# Patient Record
Sex: MALE | Race: WHITE | NOT HISPANIC OR LATINO | Employment: FULL TIME | URBAN - METROPOLITAN AREA
[De-identification: names, ages, dates, MRNs, and addresses within clinical notes are randomized per-mention and may not be internally consistent; named-entity substitution may affect disease eponyms.]

---

## 2017-02-08 ENCOUNTER — OFFICE VISIT (OUTPATIENT)
Dept: FAMILY MEDICINE | Facility: CLINIC | Age: 47
End: 2017-02-08
Payer: COMMERCIAL

## 2017-02-08 VITALS
SYSTOLIC BLOOD PRESSURE: 110 MMHG | BODY MASS INDEX: 27.38 KG/M2 | DIASTOLIC BLOOD PRESSURE: 72 MMHG | RESPIRATION RATE: 20 BRPM | OXYGEN SATURATION: 98 % | WEIGHT: 190.81 LBS | HEART RATE: 62 BPM

## 2017-02-08 DIAGNOSIS — R09.81 SINUS CONGESTION: Primary | ICD-10-CM

## 2017-02-08 PROCEDURE — 99999 PR PBB SHADOW E&M-EST. PATIENT-LVL III: CPT | Mod: PBBFAC,,, | Performed by: FAMILY MEDICINE

## 2017-02-08 PROCEDURE — 99213 OFFICE O/P EST LOW 20 MIN: CPT | Mod: S$GLB,,, | Performed by: FAMILY MEDICINE

## 2017-02-08 NOTE — PROGRESS NOTES
HPI:  Joshua Luna is a 46 y.o. year old male that  presents with complaint of sinus problems and low grade fever to 99F. He states marty he is not having a sore throat and not much coughing. He has nasal spray at home but has not used it. Major symptoms are of congestion of his nasal and drainage with ear pressure.   Chief Complaint   Patient presents with    Sore Throat     few days    Sinus Problem    Fever    Headache    Otalgia   .       Past Medical History   Diagnosis Date    Arthritis     Dry eyes     Dry mouth      Social History     Social History    Marital status:      Spouse name: N/A    Number of children: N/A    Years of education: N/A     Occupational History    Not on file.     Social History Main Topics    Smoking status: Never Smoker    Smokeless tobacco: Not on file    Alcohol use Yes      Comment: occasional    Drug use: No    Sexual activity: Not on file     Other Topics Concern    Not on file     Social History Narrative     Past Surgical History   Procedure Laterality Date    Vasectomy  2003    Knee surgery  2009     5    Elbow surgery  2006     right surgery    Rotator cuff repair  [id number]     right shoulder     Family History   Problem Relation Age of Onset    Cancer Mother      Breast & colon    Arthritis Mother     Bell's palsy Mother     Rheum arthritis Mother     Diabetes Father     Arthritis Father     Pulmonary fibrosis Father     Hypertension Father     Arthritis Sister     Asthma Daughter     Eczema Daughter     Asthma Son     Narcolepsy Sister     No Known Problems Sister     No Known Problems Sister     No Known Problems Sister            Review of Systems  General ROS: negative for chills, fever or weight loss  ENT ROS: negative for epistaxis,pos sore throat , congestion ,headcahe  Respiratory ROS: no cough, shortness of breath, or wheezing  Cardiovascular ROS: no chest pain or dyspnea on exertion  Gastrointestinal ROS: no abdominal  pain, change in bowel habits, or black/ bloody stools    Physical Exam:  Visit Vitals    /72 (BP Location: Right arm, Patient Position: Sitting, BP Method: Manual)    Pulse 62    Resp 20    Wt 86.5 kg (190 lb 12.9 oz)    SpO2 98%    BMI 27.38 kg/m2     General appearance: alert, cooperative, no distress  Constitutional:Oriented to person, place, and time.appears well-developed and well-nourished.  HEENT: Normocephalic, atraumatic, neck symmetrical, no nasal discharge, TM- clear bilaterally, no sinus tenderness, Throat clear with no erythema, no LAD  Lungs: clear to auscultation bilaterally, no dullness to percussion bilaterally  Heart: regular rate and rhythm without rub; no displacement of the PMI , S1&S2 present    LABS:    Complete Blood Count  Lab Results   Component Value Date    RBC 4.19 (L) 09/15/2015    HGB 13.3 (L) 09/15/2015    HCT 39.3 (L) 09/15/2015    MCV 94 09/15/2015    MCH 31.7 (H) 09/15/2015    MCHC 33.8 09/15/2015    RDW 12.4 09/15/2015     09/15/2015    MPV 10.3 09/15/2015    GRAN 2.2 09/15/2015    GRAN 44.7 09/15/2015    LYMPH 2.0 09/15/2015    LYMPH 40.1 09/15/2015    MONO 0.5 09/15/2015    MONO 10.5 09/15/2015    EOS 0.2 09/15/2015    BASO 0.03 09/15/2015    EOSINOPHIL 3.9 09/15/2015    BASOPHIL 0.6 09/15/2015    DIFFMETHOD Automated 09/15/2015       Comprehensive Metabolic Panel  Lab Results   Component Value Date    GLU 87 09/15/2015    BUN 6 09/15/2015    CREATININE 0.8 09/15/2015     (L) 09/15/2015    K 4.0 09/15/2015     09/15/2015    PROT 6.9 09/15/2015    ALBUMIN 4.5 09/15/2015    BILITOT 0.3 09/15/2015    AST 20 09/15/2015    ALKPHOS 58 09/15/2015    CO2 27 09/15/2015    ALT 22 09/15/2015    ANIONGAP 8 09/15/2015    EGFRNONAA >60.0 09/15/2015    ESTGFRAFRICA >60.0 09/15/2015       TSH  Lab Results   Component Value Date    TSH 1.468 09/15/2015         Assessment:    ICD-10-CM ICD-9-CM    1. Sinus congestion R09.81 478.19          Plan:    Return if  symptoms worsen or fail to improve.          Sherry Farley MD

## 2017-02-08 NOTE — MR AVS SNAPSHOT
[facility]  [address] [place] [address]  Henny MENDOZA [address]  Phone: [phone]  Fax: [phone]                  Joshua Luna   2017 3:20 PM   Office Visit    Description:  Male : 1970   Provider:  Sherry Farley MD   Department:  [facility]           Reason for Visit     Sore Throat     Sinus Problem     Fever     Headache     Otalgia                To Do List           Goals (5 Years of Data)     None      [facility] On Call     [facility] On Call Nurse [facility] Line -  Assistance  Registered nurses in the [facility] On Call Center provide clinical advisement, health education, appointment booking, and other advisory services.  Call for this free service at [phone].             Medications           Message regarding Medications     Verify the changes and/or additions to your medication regime listed below are the same as discussed with your clinician today.  If any of these changes or additions are incorrect, please notify your healthcare provider.             Verify that the below list of medications is an accurate representation of the medications you are currently taking.  If none reported, the list may be blank. If incorrect, please contact your healthcare provider. Carry this list with you in case of emergency.           Current Medications     hydrocodone-acetaminophen 7.5-325mg (NORCO) 7.5-325 mg per tablet Take 1 tablet by mouth every 6 (six) hours as needed for Pain.    ibuprofen (ADVIL,MOTRIN) 600 MG tablet TAKE ONE TABLET BY MOUTH THREE TIMES DAILY           Clinical Reference Information           Your Vitals Were     BP Pulse Resp Weight SpO2 BMI    110/72 (BP Location: Right arm, Patient Position: Sitting, BP Method: Manual) 62 20 86.5 kg (190 lb 12.9 oz) 98% 27.38 kg/m2      Blood Pressure          Most Recent Value    BP  110/72      Allergies as of 2017     No Known Allergies      Immunizations Administered on Date of Encounter - 2017     None       Language Assistance Services     ATTENTION: Language assistance services are available, free of charge. Please call [phone].      ATENCIÓN: Si habla rizwana, tiene a pardo disposición servicios gratuitos de asistencia lingüística. Llame al [phone].     CHÚ Ý: N?u b?n nói Ti?ng Vi?t, có các d?ch v? h? tr? ngôn ng? mi?n phí dành cho b?n. G?i s? [phone].         [facility] complies with applicable Federal civil rights laws and does not discriminate on the basis of race, color, national origin, age, disability, or sex.

## 2017-09-27 ENCOUNTER — CLINICAL SUPPORT (OUTPATIENT)
Dept: OTHER | Facility: CLINIC | Age: 47
End: 2017-09-27
Payer: COMMERCIAL

## 2017-09-27 VITALS
WEIGHT: 198 LBS | SYSTOLIC BLOOD PRESSURE: 141 MMHG | HEIGHT: 70 IN | DIASTOLIC BLOOD PRESSURE: 93 MMHG | BODY MASS INDEX: 28.35 KG/M2

## 2017-09-27 DIAGNOSIS — Z00.8 HEALTH EXAMINATION IN POPULATION SURVEYS: Primary | ICD-10-CM

## 2017-09-27 LAB
GLUCOSE SERPL-MCNC: 89 MG/DL (ref 60–140)
POC CHOLESTEROL, HDL: 61 MG/DL (ref 40–?)
POC CHOLESTEROL, LDL: 102 MG/DL (ref ?–160)
POC CHOLESTEROL, TOTAL: 180 MG/DL (ref ?–240)
POC GLUCOSE FASTING: NORMAL MG/DL (ref 60–110)
POC TOTAL CHOLESTEROL / HDL RATIO: 2.95 (ref ?–6)
POC TRIGLYCERIDES: 88 MG/DL (ref ?–160)

## 2017-09-27 PROCEDURE — 80061 LIPID PANEL: CPT | Mod: QW,S$GLB,, | Performed by: INTERNAL MEDICINE

## 2017-09-27 PROCEDURE — 82947 ASSAY GLUCOSE BLOOD QUANT: CPT | Mod: QW,S$GLB,, | Performed by: INTERNAL MEDICINE

## 2017-09-27 PROCEDURE — 99401 PREV MED CNSL INDIV APPRX 15: CPT | Mod: S$GLB,,, | Performed by: INTERNAL MEDICINE

## 2018-03-19 DIAGNOSIS — R73.9 HYPERGLYCEMIA: Primary | ICD-10-CM

## 2018-07-06 ENCOUNTER — OFFICE VISIT (OUTPATIENT)
Dept: FAMILY MEDICINE | Facility: CLINIC | Age: 48
End: 2018-07-06
Payer: COMMERCIAL

## 2018-07-06 VITALS
HEART RATE: 96 BPM | OXYGEN SATURATION: 99 % | BODY MASS INDEX: 28.28 KG/M2 | DIASTOLIC BLOOD PRESSURE: 88 MMHG | SYSTOLIC BLOOD PRESSURE: 138 MMHG | TEMPERATURE: 98 F | HEIGHT: 70 IN | WEIGHT: 197.56 LBS

## 2018-07-06 DIAGNOSIS — M25.462 KNEE EFFUSION, LEFT: ICD-10-CM

## 2018-07-06 DIAGNOSIS — Z23 NEED FOR TETANUS BOOSTER: Primary | ICD-10-CM

## 2018-07-06 PROCEDURE — 90471 IMMUNIZATION ADMIN: CPT | Mod: S$GLB,,, | Performed by: FAMILY MEDICINE

## 2018-07-06 PROCEDURE — 3008F BODY MASS INDEX DOCD: CPT | Mod: CPTII,S$GLB,, | Performed by: FAMILY MEDICINE

## 2018-07-06 PROCEDURE — 99212 OFFICE O/P EST SF 10 MIN: CPT | Mod: 25,S$GLB,, | Performed by: FAMILY MEDICINE

## 2018-07-06 PROCEDURE — 90715 TDAP VACCINE 7 YRS/> IM: CPT | Mod: S$GLB,,, | Performed by: FAMILY MEDICINE

## 2018-07-06 RX ORDER — PREDNISONE 20 MG/1
TABLET ORAL
Qty: 14 TABLET | Refills: 1 | Status: SHIPPED | OUTPATIENT
Start: 2018-07-06 | End: 2019-05-21

## 2018-07-06 RX ORDER — CLONIDINE HYDROCHLORIDE 0.1 MG/1
1 TABLET ORAL DAILY PRN
Refills: 0 | COMMUNITY
Start: 2018-05-31 | End: 2019-05-21

## 2018-07-08 NOTE — PROGRESS NOTES
" Patient ID: Joshua Luna is a 47 y.o. male.    Chief Complaint: Knee Pain (left)    HPI       Joshua Luna is a 47 y.o. male with hx of torn meniscus and he re injured it recently - it was swollen a few days but feels it has gone down some and not as painful.  Going on vacation would like me to consider drainage.        Review of Symptoms    Constitutional  No change in activity, No chills fever   Resp  Neg hemoptysis, stridor, choking  CVS  Neg chest pain, palpitations    /88   Pulse 96   Temp 97.6 °F (36.4 °C)   Ht 5' 10" (1.778 m)   Wt 89.6 kg (197 lb 8.5 oz)   SpO2 99%   BMI 28.34 kg/m²     Physical Exam    Constitutional:   Oriented to person, place, and time.appears well-developed and well-nourished.   No distress.     HENT  Head: Normocephalic and atraumatic  Right Ear: External ear normal.   Left Ear: External ear normal.   Nose: External nose normal.   Mouth: Moist mucous membranes    Eyes:   Conjunctivae are normal. Right eye exhibits no discharge. Left eye exhibits no discharge. No scleral icterus. No periorbital edema    Musculoskeletal:  No edema. No obvious deformity No wasting     Neurological:  Alert and oriented to person, place, and time. Coordination normal.     Skin:   Skin is warm and dry.  No diaphoresis.   No rash noted.     Psychiatric: Normal mood and affect. Behavior is normal. Judgment and thought content normal.     Knee left  Small effusuion  Pain only with felxeion of over 90 degrees.    Assessment / Plan:      ICD-10-CM ICD-9-CM   1. Need for tetanus booster Z23 V03.7   2. Knee effusion, left M25.462 719.06     Need for tetanus booster    Knee effusion, left    Other orders  -     (In Office Administered) Tdap Vaccine  -     predniSONE (DELTASONE) 20 MG tablet; 2 pills daily for 5-7 days  Dispense: 14 tablet; Refill: 1      "

## 2018-11-06 ENCOUNTER — CLINICAL SUPPORT (OUTPATIENT)
Dept: OTHER | Facility: CLINIC | Age: 48
End: 2018-11-06
Payer: COMMERCIAL

## 2018-11-06 DIAGNOSIS — Z00.8 ENCOUNTER FOR OTHER GENERAL EXAMINATION: ICD-10-CM

## 2018-11-06 PROCEDURE — 82947 ASSAY GLUCOSE BLOOD QUANT: CPT | Mod: QW,S$GLB,, | Performed by: INTERNAL MEDICINE

## 2018-11-06 PROCEDURE — 99401 PREV MED CNSL INDIV APPRX 15: CPT | Mod: S$GLB,,, | Performed by: INTERNAL MEDICINE

## 2018-11-06 PROCEDURE — 80061 LIPID PANEL: CPT | Mod: QW,S$GLB,, | Performed by: INTERNAL MEDICINE

## 2018-11-08 VITALS — HEIGHT: 69 IN | BODY MASS INDEX: 29.17 KG/M2

## 2018-11-08 LAB
GLUCOSE SERPL-MCNC: 93 MG/DL (ref 70–110)
HDLC SERPL-MCNC: 43 MG/DL
POC CHOLESTEROL, LDL (DOCK): 53 MG/DL
POC CHOLESTEROL, TOTAL: 153 MG/DL
TRIGL SERPL-MCNC: 286 MG/DL

## 2019-05-21 ENCOUNTER — OFFICE VISIT (OUTPATIENT)
Dept: FAMILY MEDICINE | Facility: CLINIC | Age: 49
End: 2019-05-21
Payer: COMMERCIAL

## 2019-05-21 VITALS
BODY MASS INDEX: 29.4 KG/M2 | SYSTOLIC BLOOD PRESSURE: 138 MMHG | HEART RATE: 76 BPM | WEIGHT: 198.5 LBS | TEMPERATURE: 98 F | OXYGEN SATURATION: 97 % | HEIGHT: 69 IN | DIASTOLIC BLOOD PRESSURE: 98 MMHG

## 2019-05-21 DIAGNOSIS — Z00.00 ROUTINE HEALTH MAINTENANCE: ICD-10-CM

## 2019-05-21 DIAGNOSIS — R03.0 ELEVATED BLOOD PRESSURE READING: Primary | ICD-10-CM

## 2019-05-21 PROCEDURE — 3008F BODY MASS INDEX DOCD: CPT | Mod: CPTII,S$GLB,, | Performed by: FAMILY MEDICINE

## 2019-05-21 PROCEDURE — 99212 OFFICE O/P EST SF 10 MIN: CPT | Mod: S$GLB,,, | Performed by: FAMILY MEDICINE

## 2019-05-21 PROCEDURE — 3008F PR BODY MASS INDEX (BMI) DOCUMENTED: ICD-10-PCS | Mod: CPTII,S$GLB,, | Performed by: FAMILY MEDICINE

## 2019-05-21 PROCEDURE — 99212 PR OFFICE/OUTPT VISIT, EST, LEVL II, 10-19 MIN: ICD-10-PCS | Mod: S$GLB,,, | Performed by: FAMILY MEDICINE

## 2019-05-21 RX ORDER — VALSARTAN 80 MG/1
80 TABLET ORAL DAILY
Qty: 90 TABLET | Refills: 3 | Status: SHIPPED | OUTPATIENT
Start: 2019-05-21 | End: 2019-12-19 | Stop reason: SDUPTHER

## 2019-05-23 ENCOUNTER — PATIENT MESSAGE (OUTPATIENT)
Dept: FAMILY MEDICINE | Facility: CLINIC | Age: 49
End: 2019-05-23

## 2019-05-28 ENCOUNTER — PATIENT MESSAGE (OUTPATIENT)
Dept: FAMILY MEDICINE | Facility: CLINIC | Age: 49
End: 2019-05-28

## 2019-05-28 NOTE — PROGRESS NOTES
Patient ID: Joshua Luna is a 48 y.o. male.    Chief Complaint: Elevated BP; fatigue headaches; night sweat, dizzy    HPI       Joshua Luna is a 48 y.o. male complains of a few day history of of feeling fatigued and not himself.  Some dizziness feeling of headaches and dizzy.  No macarena myalgia.  No macarena fever or chills-no nausea vomiting or diarrhea.  No sick contacts.  Does not feel himself.  Checked blood pressure and was elevated at home.    Review of Symptoms    Constitutional  some activity change, No chills/ fever   Resp  Neg hemoptysis, stridor, choking  CVS  Neg chest pain, palpitations        Physical Exam    Vitals:    05/21/19 1647   BP: (!) 138/98   Pulse: 76   Temp: 98.4 °F (36.9 °C)       Constitutional:   Oriented to person, place, and time.appears well-developed and well-nourished.   No distress.     HENT:   Head: Normocephalic and atraumatic.     Right Ear: Tympanic membrane, external ear and ear canal normal     Left Ear: Tympanic membrane, external ear and ear canal normal    Nose: External Normal. Normal turbinates, No rhinorrhea     Mouth/Throat: Uvula is midline, oropharynx is clear and moist and mucous membranes are normal.     Neck: supple no anterior cervical adenopathy    Eyes:   Conjunctivae are normal. Right eye exhibits no discharge. Left eye exhibits no discharge. No scleral icterus. No periorbital edema       Cardiovascular:  Regular rate and rhythm with normal S1 and S2     Pulmonary/Chest:   Clear to auscultation bilaterally without wheezes, rhonchi or rales        Musculoskeletal:  No edema. No obvious deformity No wasting     Neurological:   Alert and oriented to person, place, and time. Coordination normal.     Skin:   Skin is warm and dry.   No diaphoresis.   No rash noted.    Psychiatric: Normal mood and affect. Behavior is normal. Judgment and thought content normal.       Assessment / Plan:      ICD-10-CM ICD-9-CM   1. Elevated blood pressure reading R03.0 796.2   2. Routine  health maintenance Z00.00 V70.0     Elevated blood pressure reading    Routine health maintenance  -     Comprehensive metabolic panel; Future  -     CBC auto differential; Future  -     Lipid panel; Future  -     TSH; Future    Other orders  -     valsartan (DIOVAN) 80 MG tablet; Take 1 tablet (80 mg total) by mouth once daily.  Dispense: 90 tablet; Refill: 3

## 2019-09-17 ENCOUNTER — CLINICAL SUPPORT (OUTPATIENT)
Dept: OTHER | Facility: CLINIC | Age: 49
End: 2019-09-17
Payer: COMMERCIAL

## 2019-09-17 DIAGNOSIS — Z00.8 ENCOUNTER FOR OTHER GENERAL EXAMINATION: ICD-10-CM

## 2019-09-17 PROCEDURE — 82947 ASSAY GLUCOSE BLOOD QUANT: CPT | Mod: QW,S$GLB,, | Performed by: INTERNAL MEDICINE

## 2019-09-17 PROCEDURE — 82947 PR  ASSAY QUANTITATIVE,BLOOD GLUCOSE: ICD-10-PCS | Mod: QW,S$GLB,, | Performed by: INTERNAL MEDICINE

## 2019-09-17 PROCEDURE — 80061 PR  LIPID PANEL: ICD-10-PCS | Mod: QW,S$GLB,, | Performed by: INTERNAL MEDICINE

## 2019-09-17 PROCEDURE — 99401 PREV MED CNSL INDIV APPRX 15: CPT | Mod: S$GLB,,, | Performed by: INTERNAL MEDICINE

## 2019-09-17 PROCEDURE — 80061 LIPID PANEL: CPT | Mod: QW,S$GLB,, | Performed by: INTERNAL MEDICINE

## 2019-09-17 PROCEDURE — 99401 PR PREVENT COUNSEL,INDIV,15 MIN: ICD-10-PCS | Mod: S$GLB,,, | Performed by: INTERNAL MEDICINE

## 2019-09-18 VITALS — HEIGHT: 69 IN | BODY MASS INDEX: 29.32 KG/M2

## 2019-09-18 LAB
GLUCOSE SERPL-MCNC: 93 MG/DL (ref 60–140)
HDLC SERPL-MCNC: 58 MG/DL
POC CHOLESTEROL, LDL (DOCK): 81 MG/DL
POC CHOLESTEROL, TOTAL: 182 MG/DL
TRIGL SERPL-MCNC: 213 MG/DL

## 2019-12-04 ENCOUNTER — PATIENT OUTREACH (OUTPATIENT)
Dept: ADMINISTRATIVE | Facility: HOSPITAL | Age: 49
End: 2019-12-04

## 2019-12-19 ENCOUNTER — OFFICE VISIT (OUTPATIENT)
Dept: FAMILY MEDICINE | Facility: CLINIC | Age: 49
End: 2019-12-19
Payer: COMMERCIAL

## 2019-12-19 VITALS
HEIGHT: 69 IN | DIASTOLIC BLOOD PRESSURE: 78 MMHG | HEART RATE: 70 BPM | SYSTOLIC BLOOD PRESSURE: 108 MMHG | BODY MASS INDEX: 29.78 KG/M2 | OXYGEN SATURATION: 98 % | WEIGHT: 201.06 LBS | TEMPERATURE: 98 F

## 2019-12-19 DIAGNOSIS — Z00.00 ROUTINE HEALTH MAINTENANCE: Primary | ICD-10-CM

## 2019-12-19 PROCEDURE — 99396 PREV VISIT EST AGE 40-64: CPT | Mod: S$GLB,,, | Performed by: FAMILY MEDICINE

## 2019-12-19 PROCEDURE — 99396 PR PREVENTIVE VISIT,EST,40-64: ICD-10-PCS | Mod: S$GLB,,, | Performed by: FAMILY MEDICINE

## 2019-12-19 RX ORDER — VALSARTAN 80 MG/1
80 TABLET ORAL DAILY
Qty: 90 TABLET | Refills: 3 | Status: SHIPPED | OUTPATIENT
Start: 2019-12-19 | End: 2020-12-20

## 2020-01-04 NOTE — PROGRESS NOTES
" Patient ID: Joshua Luna is a 49 y.o. male.    Chief Complaint: Annual Exam    HPI      Joshua Luna is a 49 y.o. male. here for annual exam.   No current complaints has been having persistent elevated blood pressure      Review of Symptoms    Constitutional: Negative.    HENT: Negative.    Eyes: Negative.    Respiratory: Negative.    Cardiovascular: Negative.    Gastrointestinal: Negative.    Endocrine: Negative.    Genitourinary: Negative.    Musculoskeletal: Negative.    Skin: Negative.    Allergic/Immunologic: Negative.    Neurological: Negative.    Hematological: Negative.    Psychiatric/Behavioral: Negative.      Except as above in HPI      Vitals:    12/19/19 1003   BP: 108/78   Pulse: 70   Temp: 97.8 °F (36.6 °C)   SpO2: 98%   Weight: 91.2 kg (201 lb 1 oz)   Height: 5' 9" (1.753 m)        Physical  Exam      Constitutional:  Oriented to person, place, and time. Appears well-developed and well-nourished.     HENT:   Head: Normocephalic and atraumatic.     Right Ear: Tympanic membrane, ear canal and External ear normal     Left Ear: Tympanic membrane, ear canal and External ear normal     Nose: Nose normal. No rhinorrhea or nasal deformity.     Mouth/Throat: Uvula is midline, oropharynx is clear and moist and mucous membranes are normal.      Eyes: Conjunctivae are normal. Right eye exhibits no discharge. Left eye exhibits no discharge. No scleral icterus.     Neck:  No JVD present. No tracheal deviation  [x]  Neck supple.   []  No Carotid bruit    Cardiovascular:  Regular rate and rhythm with normal S1 and S2     Pulmonary/Chest:   Clear to auscultation bilaterally without wheezes, rhonchi or rales    Musculoskeletal: Normal range of motion. No edema or tenderness.   No deformity     Lymphadenopathy:  No cervical adenopathy.     Neurological:  Alert and oriented to person, place, and time. Coordination normal.     Skin: Skin is warm and dry. No rash noted.     Psychiatric: Normal mood and affect. Speech is " normal and behavior is normal. Judgment and thought content normal.     Complete Blood Count  Lab Results   Component Value Date    RBC 4.93 05/22/2019    HGB 15.4 05/22/2019    HCT 46.3 05/22/2019    MCV 94 05/22/2019    MCH 31.2 (H) 05/22/2019    MCHC 33.3 05/22/2019    RDW 12.4 05/22/2019     05/22/2019    MPV 10.3 05/22/2019    GRAN 3.6 05/22/2019    GRAN 63.6 05/22/2019    LYMPH 1.2 05/22/2019    LYMPH 20.6 05/22/2019    MONO 0.8 05/22/2019    MONO 13.3 05/22/2019    EOS 0.1 05/22/2019    BASO 0.03 05/22/2019    EOSINOPHIL 2.0 05/22/2019    BASOPHIL 0.5 05/22/2019    DIFFMETHOD Automated 05/22/2019       Comprehensive Metabolic Panel  No results found for: GLU, BUN, CREATININE, NA, K, CL, PROT, ALBUMIN, BILITOT, AST, ALKPHOS, CO2, ALT, ANIONGAP, EGFRNONAA, ESTGFRAFRICA    TSH  No results found for: TSH    Assessment / Plan:      ICD-10-CM ICD-9-CM   1. Routine health maintenance Z00.00 V70.0     Routine health maintenance    Other orders  -     valsartan (DIOVAN) 80 MG tablet; Take 1 tablet (80 mg total) by mouth once daily.  Dispense: 90 tablet; Refill: 3          Discussed how to stay healthy including: diet, exercise, refraining from smoking and discussed screening exams / tests needed for age, sex and family Hx.

## 2020-09-01 ENCOUNTER — CLINICAL SUPPORT (OUTPATIENT)
Dept: OTHER | Facility: CLINIC | Age: 50
End: 2020-09-01
Payer: COMMERCIAL

## 2020-09-01 DIAGNOSIS — Z00.8 ENCOUNTER FOR OTHER GENERAL EXAMINATION: ICD-10-CM

## 2020-09-01 PROCEDURE — 99401 PREV MED CNSL INDIV APPRX 15: CPT | Mod: S$GLB,,, | Performed by: INTERNAL MEDICINE

## 2020-09-01 PROCEDURE — 82947 ASSAY GLUCOSE BLOOD QUANT: CPT | Mod: QW,S$GLB,, | Performed by: INTERNAL MEDICINE

## 2020-09-01 PROCEDURE — 82947 PR  ASSAY QUANTITATIVE,BLOOD GLUCOSE: ICD-10-PCS | Mod: QW,S$GLB,, | Performed by: INTERNAL MEDICINE

## 2020-09-01 PROCEDURE — 80061 LIPID PANEL: CPT | Mod: QW,S$GLB,, | Performed by: INTERNAL MEDICINE

## 2020-09-01 PROCEDURE — 99401 PR PREVENT COUNSEL,INDIV,15 MIN: ICD-10-PCS | Mod: S$GLB,,, | Performed by: INTERNAL MEDICINE

## 2020-09-01 PROCEDURE — 80061 PR  LIPID PANEL: ICD-10-PCS | Mod: QW,S$GLB,, | Performed by: INTERNAL MEDICINE

## 2020-09-02 VITALS — BODY MASS INDEX: 29.69 KG/M2 | HEIGHT: 69 IN

## 2020-09-02 LAB
GLUCOSE SERPL-MCNC: 97 MG/DL (ref 60–140)
HDLC SERPL-MCNC: 58 MG/DL
POC CHOLESTEROL, LDL (DOCK): 84 MG/DL
POC CHOLESTEROL, TOTAL: 169 MG/DL
TRIGL SERPL-MCNC: 134 MG/DL

## 2020-12-20 RX ORDER — VALSARTAN 80 MG/1
TABLET ORAL
Qty: 90 TABLET | Refills: 3 | Status: SHIPPED | OUTPATIENT
Start: 2020-12-20 | End: 2022-03-15 | Stop reason: SDUPTHER

## 2020-12-21 NOTE — TELEPHONE ENCOUNTER
I spoke with the pt and scheduled to see md jan 5  And also he will call back with home bp readings

## 2021-01-04 ENCOUNTER — PATIENT MESSAGE (OUTPATIENT)
Dept: ADMINISTRATIVE | Facility: HOSPITAL | Age: 51
End: 2021-01-04

## 2021-01-05 ENCOUNTER — OFFICE VISIT (OUTPATIENT)
Dept: FAMILY MEDICINE | Facility: CLINIC | Age: 51
End: 2021-01-05
Payer: COMMERCIAL

## 2021-01-05 VITALS
TEMPERATURE: 98 F | SYSTOLIC BLOOD PRESSURE: 134 MMHG | WEIGHT: 206.69 LBS | BODY MASS INDEX: 29.59 KG/M2 | HEIGHT: 70 IN | HEART RATE: 89 BPM | OXYGEN SATURATION: 98 % | DIASTOLIC BLOOD PRESSURE: 72 MMHG

## 2021-01-05 DIAGNOSIS — R03.0 ELEVATED BLOOD PRESSURE READING: ICD-10-CM

## 2021-01-05 DIAGNOSIS — Z00.00 ROUTINE HEALTH MAINTENANCE: Primary | ICD-10-CM

## 2021-01-05 PROCEDURE — 3008F PR BODY MASS INDEX (BMI) DOCUMENTED: ICD-10-PCS | Mod: CPTII,S$GLB,, | Performed by: FAMILY MEDICINE

## 2021-01-05 PROCEDURE — 3008F BODY MASS INDEX DOCD: CPT | Mod: CPTII,S$GLB,, | Performed by: FAMILY MEDICINE

## 2021-01-05 PROCEDURE — 1126F AMNT PAIN NOTED NONE PRSNT: CPT | Mod: S$GLB,,, | Performed by: FAMILY MEDICINE

## 2021-01-05 PROCEDURE — 99396 PREV VISIT EST AGE 40-64: CPT | Mod: S$GLB,,, | Performed by: FAMILY MEDICINE

## 2021-01-05 PROCEDURE — 99396 PR PREVENTIVE VISIT,EST,40-64: ICD-10-PCS | Mod: S$GLB,,, | Performed by: FAMILY MEDICINE

## 2021-01-05 PROCEDURE — 1126F PR PAIN SEVERITY QUANTIFIED, NO PAIN PRESENT: ICD-10-PCS | Mod: S$GLB,,, | Performed by: FAMILY MEDICINE

## 2021-02-09 ENCOUNTER — OFFICE VISIT (OUTPATIENT)
Dept: URGENT CARE | Facility: CLINIC | Age: 51
End: 2021-02-09
Payer: COMMERCIAL

## 2021-02-09 VITALS
BODY MASS INDEX: 27.85 KG/M2 | TEMPERATURE: 98 F | HEIGHT: 69 IN | DIASTOLIC BLOOD PRESSURE: 77 MMHG | WEIGHT: 188 LBS | OXYGEN SATURATION: 98 % | HEART RATE: 72 BPM | RESPIRATION RATE: 18 BRPM | SYSTOLIC BLOOD PRESSURE: 142 MMHG

## 2021-02-09 DIAGNOSIS — J02.9 SORE THROAT: ICD-10-CM

## 2021-02-09 DIAGNOSIS — Z11.52 ENCOUNTER FOR SCREENING FOR COVID-19: ICD-10-CM

## 2021-02-09 DIAGNOSIS — R05.8 DRY COUGH: ICD-10-CM

## 2021-02-09 DIAGNOSIS — U07.1 COVID-19 VIRUS DETECTED: ICD-10-CM

## 2021-02-09 DIAGNOSIS — Z71.89 EDUCATED ABOUT COVID-19 VIRUS INFECTION: ICD-10-CM

## 2021-02-09 DIAGNOSIS — U07.1 COVID-19 VIRUS INFECTION: Primary | ICD-10-CM

## 2021-02-09 LAB
CTP QC/QA: YES
CTP QC/QA: YES
MOLECULAR STREP A: NEGATIVE
SARS-COV-2 RDRP RESP QL NAA+PROBE: POSITIVE

## 2021-02-09 PROCEDURE — 99214 OFFICE O/P EST MOD 30 MIN: CPT | Mod: S$GLB,CS,, | Performed by: PHYSICIAN ASSISTANT

## 2021-02-09 PROCEDURE — 87651 STREP A DNA AMP PROBE: CPT | Mod: QW,S$GLB,, | Performed by: PHYSICIAN ASSISTANT

## 2021-02-09 PROCEDURE — 3008F PR BODY MASS INDEX (BMI) DOCUMENTED: ICD-10-PCS | Mod: CPTII,S$GLB,, | Performed by: PHYSICIAN ASSISTANT

## 2021-02-09 PROCEDURE — 3008F BODY MASS INDEX DOCD: CPT | Mod: CPTII,S$GLB,, | Performed by: PHYSICIAN ASSISTANT

## 2021-02-09 PROCEDURE — U0002 COVID-19 LAB TEST NON-CDC: HCPCS | Mod: QW,CR,S$GLB, | Performed by: PHYSICIAN ASSISTANT

## 2021-02-09 PROCEDURE — 87651 POCT STREP A MOLECULAR: ICD-10-PCS | Mod: QW,S$GLB,, | Performed by: PHYSICIAN ASSISTANT

## 2021-02-09 PROCEDURE — U0002: ICD-10-PCS | Mod: QW,CR,S$GLB, | Performed by: PHYSICIAN ASSISTANT

## 2021-02-09 PROCEDURE — 99214 PR OFFICE/OUTPT VISIT, EST, LEVL IV, 30-39 MIN: ICD-10-PCS | Mod: S$GLB,CS,, | Performed by: PHYSICIAN ASSISTANT

## 2021-02-09 RX ORDER — BENZONATATE 200 MG/1
200 CAPSULE ORAL 3 TIMES DAILY PRN
Qty: 30 CAPSULE | Refills: 0 | Status: SHIPPED | OUTPATIENT
Start: 2021-02-09 | End: 2021-02-19

## 2021-02-09 RX ORDER — PROMETHAZINE HYDROCHLORIDE AND DEXTROMETHORPHAN HYDROBROMIDE 6.25; 15 MG/5ML; MG/5ML
5 SYRUP ORAL NIGHTLY PRN
Qty: 118 ML | Refills: 0 | Status: SHIPPED | OUTPATIENT
Start: 2021-02-09 | End: 2021-02-19

## 2021-02-09 RX ORDER — ALBUTEROL SULFATE 90 UG/1
2 AEROSOL, METERED RESPIRATORY (INHALATION) EVERY 6 HOURS PRN
Qty: 18 G | Refills: 0 | Status: SHIPPED | OUTPATIENT
Start: 2021-02-09 | End: 2023-01-09

## 2021-05-04 ENCOUNTER — PATIENT MESSAGE (OUTPATIENT)
Dept: RESEARCH | Facility: HOSPITAL | Age: 51
End: 2021-05-04

## 2021-05-10 ENCOUNTER — PATIENT MESSAGE (OUTPATIENT)
Dept: RESEARCH | Facility: HOSPITAL | Age: 51
End: 2021-05-10

## 2021-10-07 ENCOUNTER — OFFICE VISIT (OUTPATIENT)
Dept: FAMILY MEDICINE | Facility: CLINIC | Age: 51
End: 2021-10-07
Payer: COMMERCIAL

## 2021-10-07 VITALS
SYSTOLIC BLOOD PRESSURE: 130 MMHG | TEMPERATURE: 98 F | HEART RATE: 71 BPM | BODY MASS INDEX: 28.88 KG/M2 | HEIGHT: 69 IN | WEIGHT: 195 LBS | OXYGEN SATURATION: 99 % | DIASTOLIC BLOOD PRESSURE: 86 MMHG

## 2021-10-07 DIAGNOSIS — M79.10 PAIN ON MOVEMENT OF SKELETAL MUSCLE: Primary | ICD-10-CM

## 2021-10-07 DIAGNOSIS — Z12.11 ENCOUNTER FOR SCREENING FOR MALIGNANT NEOPLASM OF COLON: ICD-10-CM

## 2021-10-07 PROCEDURE — 3079F DIAST BP 80-89 MM HG: CPT | Mod: CPTII,S$GLB,, | Performed by: FAMILY MEDICINE

## 2021-10-07 PROCEDURE — 3075F SYST BP GE 130 - 139MM HG: CPT | Mod: CPTII,S$GLB,, | Performed by: FAMILY MEDICINE

## 2021-10-07 PROCEDURE — 4010F ACE/ARB THERAPY RXD/TAKEN: CPT | Mod: CPTII,S$GLB,, | Performed by: FAMILY MEDICINE

## 2021-10-07 PROCEDURE — 3075F PR MOST RECENT SYSTOLIC BLOOD PRESS GE 130-139MM HG: ICD-10-PCS | Mod: CPTII,S$GLB,, | Performed by: FAMILY MEDICINE

## 2021-10-07 PROCEDURE — 1159F MED LIST DOCD IN RCRD: CPT | Mod: CPTII,S$GLB,, | Performed by: FAMILY MEDICINE

## 2021-10-07 PROCEDURE — 1159F PR MEDICATION LIST DOCUMENTED IN MEDICAL RECORD: ICD-10-PCS | Mod: CPTII,S$GLB,, | Performed by: FAMILY MEDICINE

## 2021-10-07 PROCEDURE — 3008F PR BODY MASS INDEX (BMI) DOCUMENTED: ICD-10-PCS | Mod: CPTII,S$GLB,, | Performed by: FAMILY MEDICINE

## 2021-10-07 PROCEDURE — 99212 OFFICE O/P EST SF 10 MIN: CPT | Mod: S$GLB,,, | Performed by: FAMILY MEDICINE

## 2021-10-07 PROCEDURE — 1160F PR REVIEW ALL MEDS BY PRESCRIBER/CLIN PHARMACIST DOCUMENTED: ICD-10-PCS | Mod: CPTII,S$GLB,, | Performed by: FAMILY MEDICINE

## 2021-10-07 PROCEDURE — 1160F RVW MEDS BY RX/DR IN RCRD: CPT | Mod: CPTII,S$GLB,, | Performed by: FAMILY MEDICINE

## 2021-10-07 PROCEDURE — 99212 PR OFFICE/OUTPT VISIT, EST, LEVL II, 10-19 MIN: ICD-10-PCS | Mod: S$GLB,,, | Performed by: FAMILY MEDICINE

## 2021-10-07 PROCEDURE — 4010F PR ACE/ARB THEARPY RXD/TAKEN: ICD-10-PCS | Mod: CPTII,S$GLB,, | Performed by: FAMILY MEDICINE

## 2021-10-07 PROCEDURE — 3008F BODY MASS INDEX DOCD: CPT | Mod: CPTII,S$GLB,, | Performed by: FAMILY MEDICINE

## 2021-10-07 PROCEDURE — 3079F PR MOST RECENT DIASTOLIC BLOOD PRESSURE 80-89 MM HG: ICD-10-PCS | Mod: CPTII,S$GLB,, | Performed by: FAMILY MEDICINE

## 2021-11-05 ENCOUNTER — PATIENT MESSAGE (OUTPATIENT)
Dept: GASTROENTEROLOGY | Facility: CLINIC | Age: 51
End: 2021-11-05
Payer: COMMERCIAL

## 2021-11-05 ENCOUNTER — TELEPHONE (OUTPATIENT)
Dept: GASTROENTEROLOGY | Facility: CLINIC | Age: 51
End: 2021-11-05
Payer: COMMERCIAL

## 2021-11-08 RX ORDER — SODIUM, POTASSIUM,MAG SULFATES 17.5-3.13G
1 SOLUTION, RECONSTITUTED, ORAL ORAL DAILY
Qty: 1 KIT | Refills: 0 | Status: SHIPPED | OUTPATIENT
Start: 2021-11-08 | End: 2021-11-10

## 2021-12-13 RX ORDER — SODIUM, POTASSIUM,MAG SULFATES 17.5-3.13G
1 SOLUTION, RECONSTITUTED, ORAL ORAL DAILY
Qty: 1 KIT | Refills: 0 | Status: SHIPPED | OUTPATIENT
Start: 2021-12-13 | End: 2021-12-15

## 2021-12-17 PROBLEM — Z86.010 PERSONAL HISTORY OF COLONIC POLYPS: Status: ACTIVE | Noted: 2021-12-17

## 2021-12-17 PROBLEM — Z86.0100 PERSONAL HISTORY OF COLONIC POLYPS: Status: ACTIVE | Noted: 2021-12-17

## 2021-12-23 ENCOUNTER — IMMUNIZATION (OUTPATIENT)
Dept: PHARMACY | Facility: CLINIC | Age: 51
End: 2021-12-23
Payer: COMMERCIAL

## 2021-12-23 DIAGNOSIS — Z23 NEED FOR VACCINATION: Primary | ICD-10-CM

## 2021-12-28 ENCOUNTER — TELEPHONE (OUTPATIENT)
Dept: GASTROENTEROLOGY | Facility: CLINIC | Age: 51
End: 2021-12-28
Payer: COMMERCIAL

## 2022-01-14 ENCOUNTER — CLINICAL SUPPORT (OUTPATIENT)
Dept: OTHER | Facility: CLINIC | Age: 52
End: 2022-01-14
Payer: COMMERCIAL

## 2022-01-14 DIAGNOSIS — Z00.8 ENCOUNTER FOR OTHER GENERAL EXAMINATION: ICD-10-CM

## 2022-01-14 PROCEDURE — 99401 PR PREVENT COUNSEL,INDIV,15 MIN: ICD-10-PCS | Mod: S$GLB,,, | Performed by: INTERNAL MEDICINE

## 2022-01-14 PROCEDURE — 80061 PR  LIPID PANEL: ICD-10-PCS | Mod: QW,S$GLB,, | Performed by: INTERNAL MEDICINE

## 2022-01-14 PROCEDURE — 82947 PR  ASSAY QUANTITATIVE,BLOOD GLUCOSE: ICD-10-PCS | Mod: QW,S$GLB,, | Performed by: INTERNAL MEDICINE

## 2022-01-14 PROCEDURE — 82947 ASSAY GLUCOSE BLOOD QUANT: CPT | Mod: QW,S$GLB,, | Performed by: INTERNAL MEDICINE

## 2022-01-14 PROCEDURE — 80061 LIPID PANEL: CPT | Mod: QW,S$GLB,, | Performed by: INTERNAL MEDICINE

## 2022-01-14 PROCEDURE — 99401 PREV MED CNSL INDIV APPRX 15: CPT | Mod: S$GLB,,, | Performed by: INTERNAL MEDICINE

## 2022-01-15 VITALS — HEIGHT: 70 IN | BODY MASS INDEX: 28.36 KG/M2

## 2022-01-15 LAB
GLUCOSE SERPL-MCNC: 108 MG/DL (ref 60–140)
HDLC SERPL-MCNC: 51 MG/DL
POC CHOLESTEROL, LDL (DOCK): 70 MG/DL
POC CHOLESTEROL, TOTAL: 150 MG/DL
TRIGL SERPL-MCNC: 145 MG/DL

## 2022-03-11 RX ORDER — IPRATROPIUM BROMIDE 21 UG/1
2 SPRAY, METERED NASAL 3 TIMES DAILY
Qty: 90 ML | Refills: 3 | Status: SHIPPED | OUTPATIENT
Start: 2022-03-11 | End: 2023-01-05 | Stop reason: SDUPTHER

## 2022-03-15 RX ORDER — VALSARTAN 80 MG/1
80 TABLET ORAL DAILY
Qty: 90 TABLET | Refills: 3 | Status: SHIPPED | OUTPATIENT
Start: 2022-03-15 | End: 2023-01-05 | Stop reason: SDUPTHER

## 2022-03-16 NOTE — TELEPHONE ENCOUNTER
Care Due:                  Date            Visit Type   Department     Provider  --------------------------------------------------------------------------------                                MYCHART                              FOLLOWUP/OF  Last Visit: 10-      FICE VISIT   None Found     Rico Trotter                              MYCHART                              FOLLOWUP/OF  [facility]  Next Visit: 03-      FICE VISIT   MEDICINE       Rico Trotter                                                            Last  Test          Frequency    Reason                     Performed    Due Date  --------------------------------------------------------------------------------    CMP.........  12 months..  valsartan................  01- 01-    Powered by [employer]. Reference number: [id number].   3/15/2022 7:49:19 PM CDT

## 2022-05-24 ENCOUNTER — TELEPHONE (OUTPATIENT)
Dept: ORTHOPEDICS | Facility: CLINIC | Age: 52
End: 2022-05-24
Payer: COMMERCIAL

## 2022-05-24 DIAGNOSIS — R52 PAIN: Primary | ICD-10-CM

## 2022-05-25 ENCOUNTER — HOSPITAL ENCOUNTER (OUTPATIENT)
Dept: RADIOLOGY | Facility: HOSPITAL | Age: 52
Discharge: HOME | End: 2022-05-25
Attending: ORTHOPAEDIC SURGERY
Payer: COMMERCIAL

## 2022-05-25 ENCOUNTER — OFFICE VISIT (OUTPATIENT)
Dept: ORTHOPEDICS | Facility: CLINIC | Age: 52
End: 2022-05-25
Payer: COMMERCIAL

## 2022-05-25 DIAGNOSIS — R52 PAIN: ICD-10-CM

## 2022-05-25 DIAGNOSIS — M17.12 PRIMARY OSTEOARTHRITIS OF LEFT KNEE: Primary | ICD-10-CM

## 2022-05-25 PROCEDURE — 1159F MED LIST DOCD IN RCRD: CPT | Mod: CPTII,S$GLB,, | Performed by: ORTHOPAEDIC SURGERY

## 2022-05-25 PROCEDURE — 4010F PR ACE/ARB THEARPY RXD/TAKEN: ICD-10-PCS | Mod: CPTII,S$GLB,, | Performed by: ORTHOPAEDIC SURGERY

## 2022-05-25 PROCEDURE — 99203 OFFICE O/P NEW LOW 30 MIN: CPT | Mod: S$GLB,,, | Performed by: ORTHOPAEDIC SURGERY

## 2022-05-25 PROCEDURE — 73564 X-RAY EXAM KNEE 4 OR MORE: CPT | Mod: TC,FY,LT

## 2022-05-25 PROCEDURE — 73564 X-RAY EXAM KNEE 4 OR MORE: CPT | Mod: 26,LT,, | Performed by: INTERNAL MEDICINE

## 2022-05-25 PROCEDURE — 99999 PR PBB SHADOW E&M-EST. PATIENT-LVL II: ICD-10-PCS | Mod: PBBFAC,,, | Performed by: ORTHOPAEDIC SURGERY

## 2022-05-25 PROCEDURE — 99999 PR PBB SHADOW E&M-EST. PATIENT-LVL II: CPT | Mod: PBBFAC,,, | Performed by: ORTHOPAEDIC SURGERY

## 2022-05-25 PROCEDURE — 4010F ACE/ARB THERAPY RXD/TAKEN: CPT | Mod: CPTII,S$GLB,, | Performed by: ORTHOPAEDIC SURGERY

## 2022-05-25 PROCEDURE — 73564 XR KNEE COMP 4 OR MORE VIEWS LEFT: ICD-10-PCS | Mod: 26,LT,, | Performed by: INTERNAL MEDICINE

## 2022-05-25 PROCEDURE — 1159F PR MEDICATION LIST DOCUMENTED IN MEDICAL RECORD: ICD-10-PCS | Mod: CPTII,S$GLB,, | Performed by: ORTHOPAEDIC SURGERY

## 2022-05-25 PROCEDURE — 99203 PR OFFICE/OUTPT VISIT, NEW, LEVL III, 30-44 MIN: ICD-10-PCS | Mod: S$GLB,,, | Performed by: ORTHOPAEDIC SURGERY

## 2022-05-25 RX ORDER — DICLOFENAC SODIUM 10 MG/G
2 GEL TOPICAL 3 TIMES DAILY
Qty: 100 G | Refills: 2 | Status: SHIPPED | OUTPATIENT
Start: 2022-05-25

## 2022-05-25 NOTE — PROGRESS NOTES
[facility], Orthopedics and Sports Medicine  [facility]    New Patient Knee Office Visit  05/25/2022       Subjective:      Joshua Luna is a 51 y.o. male referred by Dr. Rico Trotter for evaluation and treatment of left knee pain. This is evaluated as a personal injury.  The patient has the following symptoms: pain located left knee lateral side.  The symptoms began several years ago and are worsening.  The patient has had 5 knee surgeries on the left knee.  The first surgery was a patellar soft tissue realignment procedure; subsequent surgeries included meniscus debridement and cartilage procedure (? Microfracture), per patient.  Last surgery over 5 years ago by another surgeon in [facility].     Has had multiple injections in the knee, last was a cortisone injection about 1 year ago which gave moderate symptom relief.  He has had prior synvisc injections which helped for 8 months.  He has tried mobic but upsets stomach.   Continues to live an active lifestyle and struggling with knee pain.     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     Outside reports reviewed: historical medical records.    Past Medical History:   Diagnosis Date    Arthritis     Dry eyes     Dry mouth     Hypertension     Personal history of colonic polyps 12/17/2021       Patient Active Problem List   Diagnosis    Other and unspecified nonspecific immunological findings    Fatigue    Joint pain    Personal history of colonic polyps       Past Surgical History:   Procedure Laterality Date    BONE GRAFT  06/2021    COLONOSCOPY N/A 12/17/2021    Procedure: COLONOSCOPY;  Surgeon: Yumiko Donald MD;  [facility]: [facility];  Service: Endoscopy;  Laterality: N/A;    ELBOW SURGERY  2006    right surgery    KNEE SURGERY  2009    5    ROTATOR CUFF REPAIR  [id number]    right shoulder    VASECTOMY  2003        Current Outpatient Medications   Medication Instructions    albuterol (VENTOLIN  HFA) 90 mcg/actuation inhaler 2 puffs, Inhalation, Every 6 hours PRN, Rescue    diclofenac sodium (VOLTAREN) 2 g, Topical (Top), 3 times daily    ipratropium (ATROVENT) 21 mcg (0.03 %) nasal spray 2 sprays, Nasal, 3 times daily    omeprazole (PRILOSEC) 40 mg, Oral, Daily    valsartan (DIOVAN) 80 mg, Oral, Daily        Review of patient's allergies indicates:  No Known Allergies    Social History     Socioeconomic History    Marital status:    Tobacco Use    Smoking status: Never Smoker    Smokeless tobacco: Never Used   Substance and Sexual Activity    Alcohol use: Yes     Comment: occasional    Drug use: No       Family History   Problem Relation Age of Onset    Cancer Mother         Breast & colon    Arthritis Mother     Bell's palsy Mother     Rheum arthritis Mother     Diabetes Father     Arthritis Father     Pulmonary fibrosis Father     Hypertension Father     Arthritis Sister     Asthma Daughter     Eczema Daughter     Asthma Son     Narcolepsy Sister     No Known Problems Sister     No Known Problems Sister     No Known Problems Sister          Review of Systems   Constitutional: Negative for chills and fever.   HENT: Negative for hearing loss.    Eyes: Negative for blurred vision.   Cardiovascular: Negative for chest pain.   Respiratory: Negative for shortness of breath.    Gastrointestinal: Negative for abdominal pain.   Neurological: Negative for light-headedness.            Objective:      General    Constitutional: He is oriented to person, place, and time. He appears well-developed and well-nourished.   HENT:   Head: Normocephalic and atraumatic.   Eyes: EOM are normal.   Cardiovascular: Normal rate.    Pulmonary/Chest: Effort normal.   Neurological: He is alert and oriented to person, place, and time.   Psychiatric: He has a normal mood and affect.           Right Knee Exam     Inspection   Erythema: absent  Swelling: absent  Effusion: absent    Tenderness   The patient  is experiencing no tenderness.     Range of Motion   Extension: 0   Flexion: 130     Tests   Meniscus   Hilary:  Medial - negative Lateral - negative  Ligament Examination Lachman: normal (-1 to 2mm) PCL-Posterior Drawer: normal (0 to 2mm)     MCL - Valgus: normal (0 to 2mm)  LCL - Varus: normal  Patella   Patellar apprehension: negative    Other   Sensation: normal    Left Knee Exam     Inspection   Erythema: absent  Swelling: absent  Effusion: absent    Tenderness   The patient tender to palpation of the lateral joint line and medial joint line.    Crepitus   The patient has crepitus of the patella and lateral joint line.    Range of Motion   Extension: 0   Flexion: 120     Tests   Meniscus   Hilary:  Medial - negative Lateral - negative  Stability Lachman: normal (-1 to 2mm) PCL-Posterior Drawer: normal (0 to 2mm)  MCL - Valgus: normal (0 to 2mm)  LCL - Varus: normal (0 to 2mm)  Patella   Patellar apprehension: negative    Other   Sensation: normal    Muscle Strength   Right Lower Extremity   Quadriceps:  5/5   Hamstrin/5   Left Lower Extremity   Quadriceps:  5/5   Hamstrin/5     Vascular Exam     Right Pulses    Posterior Tibial:      2+        Left Pulses    Posterior Tibial:      2+            Imaging:  Radiographs of the left knee taken taken 2022 were personally reviewed from the [facility] EMR.  Multiple views of the knee are available today for review, including a standing AP, a standing notch view, lateral view, and a merchant view.  The tibiofemoral compartment demonstrates severe degenerative changes.  The patellofemoral compartment demonstrates severe degenerative changes.  No acute fractures or dislocations are noted in these images.   There are KL grade 4 changes in the lateral and patellofemoral compartments.     Procedures          Assessment:       Joshua Luna is a 51 y.o. male seen in the office today. The encounter diagnosis was Primary osteoarthritis of left knee.   Non-operative treatment is recommended at this time. The patient is very young considering the advanced nature of his knee pathology.  I have recommended conservative treatment for as long as he can tolerate.  The definitive treatment of his problem will involve knee arthroplasty, and I discussed this with him today.  We will exhaust conservative treatment until he is ready to proceed with operative intervention.  He has had corticosteroid injection recently and minimal pain relief.  He continues an active lifestyle and was given a knee home exercise program today.  He would benefit from viscosupplement injections, which was ordered today.  The natural history and expected course discussed with patient. Various treatment options were discussed, including their risks and benefits. All of the patient's questions were answered.     Plan:      1. Home exercise program given.  2. Voltaren 1% topical gel, apply to affected area TID, PRN pain.   3. Follow up in 2 weeks for gel injection when authorized         Bob Casas IV, MD   of Clinical Orthopedics  Department of Orthopedic Surgery  [facility]  Office: [phone]  Website: [email or web address]      Orders Placed This Encounter    Prior authorization Order    diclofenac sodium (VOLTAREN) 1 % Gel

## 2022-06-07 DIAGNOSIS — M17.12 PRIMARY OSTEOARTHRITIS OF LEFT KNEE: Primary | ICD-10-CM

## 2022-06-15 ENCOUNTER — PATIENT MESSAGE (OUTPATIENT)
Dept: ORTHOPEDICS | Facility: CLINIC | Age: 52
End: 2022-06-15
Payer: COMMERCIAL

## 2022-06-16 ENCOUNTER — OFFICE VISIT (OUTPATIENT)
Dept: ORTHOPEDICS | Facility: CLINIC | Age: 52
End: 2022-06-16
Payer: COMMERCIAL

## 2022-06-16 VITALS
WEIGHT: 207.44 LBS | HEIGHT: 70 IN | SYSTOLIC BLOOD PRESSURE: 137 MMHG | DIASTOLIC BLOOD PRESSURE: 84 MMHG | HEART RATE: 84 BPM | BODY MASS INDEX: 29.7 KG/M2

## 2022-06-16 DIAGNOSIS — M17.12 PRIMARY OSTEOARTHRITIS OF LEFT KNEE: Primary | ICD-10-CM

## 2022-06-16 PROCEDURE — 3079F PR MOST RECENT DIASTOLIC BLOOD PRESSURE 80-89 MM HG: ICD-10-PCS | Mod: CPTII,S$GLB,, | Performed by: ORTHOPAEDIC SURGERY

## 2022-06-16 PROCEDURE — 3075F PR MOST RECENT SYSTOLIC BLOOD PRESS GE 130-139MM HG: ICD-10-PCS | Mod: CPTII,S$GLB,, | Performed by: ORTHOPAEDIC SURGERY

## 2022-06-16 PROCEDURE — 3008F BODY MASS INDEX DOCD: CPT | Mod: CPTII,S$GLB,, | Performed by: ORTHOPAEDIC SURGERY

## 2022-06-16 PROCEDURE — 3079F DIAST BP 80-89 MM HG: CPT | Mod: CPTII,S$GLB,, | Performed by: ORTHOPAEDIC SURGERY

## 2022-06-16 PROCEDURE — 3075F SYST BP GE 130 - 139MM HG: CPT | Mod: CPTII,S$GLB,, | Performed by: ORTHOPAEDIC SURGERY

## 2022-06-16 PROCEDURE — 4010F ACE/ARB THERAPY RXD/TAKEN: CPT | Mod: CPTII,S$GLB,, | Performed by: ORTHOPAEDIC SURGERY

## 2022-06-16 PROCEDURE — 20610 LARGE JOINT ASPIRATION/INJECTION: L KNEE: ICD-10-PCS | Mod: LT,S$GLB,, | Performed by: ORTHOPAEDIC SURGERY

## 2022-06-16 PROCEDURE — 99499 UNLISTED E&M SERVICE: CPT | Mod: S$GLB,,, | Performed by: ORTHOPAEDIC SURGERY

## 2022-06-16 PROCEDURE — 3008F PR BODY MASS INDEX (BMI) DOCUMENTED: ICD-10-PCS | Mod: CPTII,S$GLB,, | Performed by: ORTHOPAEDIC SURGERY

## 2022-06-16 PROCEDURE — 99499 NO LOS: ICD-10-PCS | Mod: S$GLB,,, | Performed by: ORTHOPAEDIC SURGERY

## 2022-06-16 PROCEDURE — 99999 PR PBB SHADOW E&M-EST. PATIENT-LVL III: CPT | Mod: PBBFAC,,, | Performed by: ORTHOPAEDIC SURGERY

## 2022-06-16 PROCEDURE — 1159F PR MEDICATION LIST DOCUMENTED IN MEDICAL RECORD: ICD-10-PCS | Mod: CPTII,S$GLB,, | Performed by: ORTHOPAEDIC SURGERY

## 2022-06-16 PROCEDURE — 99999 PR PBB SHADOW E&M-EST. PATIENT-LVL III: ICD-10-PCS | Mod: PBBFAC,,, | Performed by: ORTHOPAEDIC SURGERY

## 2022-06-16 PROCEDURE — 20610 DRAIN/INJ JOINT/BURSA W/O US: CPT | Mod: LT,S$GLB,, | Performed by: ORTHOPAEDIC SURGERY

## 2022-06-16 PROCEDURE — 4010F PR ACE/ARB THEARPY RXD/TAKEN: ICD-10-PCS | Mod: CPTII,S$GLB,, | Performed by: ORTHOPAEDIC SURGERY

## 2022-06-16 PROCEDURE — 1159F MED LIST DOCD IN RCRD: CPT | Mod: CPTII,S$GLB,, | Performed by: ORTHOPAEDIC SURGERY

## 2022-06-16 NOTE — PROGRESS NOTES
Patient presents for visco injection left knee.  Symptoms same as last office note, no new issues.     Large Joint Aspiration/Injection: L knee    Date/Time: 6/16/2022 8:45 AM  Performed by: Bob Casas IV, MD  Authorized by: Bob Casas IV, MD     Consent Done?:  Yes (Verbal)  Indications:  Pain  Site marked: the procedure site was marked    Timeout: prior to procedure the correct patient, procedure, and site was verified    Prep: patient was prepped and draped in usual sterile fashion      Local anesthesia used?: Yes    Local anesthetic:  Lidocaine 1% without epinephrine    Details:  Needle Size:  22 G  Ultrasonic Guidance for needle placement?: No    Approach:  Anterolateral  Location:  Knee  Site:  L knee  Medications:  48 mg hylan g-f 20 48 mg/6 mL  Patient tolerance:  Patient tolerated the procedure well with no immediate complications        The encounter diagnosis was Primary osteoarthritis of left knee.  1) follow up as needed    Bob Casas IV

## 2022-06-21 ENCOUNTER — OFFICE VISIT (OUTPATIENT)
Dept: ORTHOPEDICS | Facility: CLINIC | Age: 52
End: 2022-06-21
Payer: COMMERCIAL

## 2022-06-21 VITALS
BODY MASS INDEX: 29.65 KG/M2 | WEIGHT: 207.13 LBS | HEIGHT: 70 IN | HEART RATE: 88 BPM | DIASTOLIC BLOOD PRESSURE: 81 MMHG | SYSTOLIC BLOOD PRESSURE: 132 MMHG

## 2022-06-21 DIAGNOSIS — M17.12 PRIMARY OSTEOARTHRITIS OF LEFT KNEE: Primary | ICD-10-CM

## 2022-06-21 PROCEDURE — 1160F PR REVIEW ALL MEDS BY PRESCRIBER/CLIN PHARMACIST DOCUMENTED: ICD-10-PCS | Mod: CPTII,S$GLB,, | Performed by: PHYSICIAN ASSISTANT

## 2022-06-21 PROCEDURE — 20610 LARGE JOINT ASPIRATION/INJECTION: L KNEE: ICD-10-PCS | Mod: LT,S$GLB,, | Performed by: PHYSICIAN ASSISTANT

## 2022-06-21 PROCEDURE — 99499 UNLISTED E&M SERVICE: CPT | Mod: S$GLB,,, | Performed by: PHYSICIAN ASSISTANT

## 2022-06-21 PROCEDURE — 3075F SYST BP GE 130 - 139MM HG: CPT | Mod: CPTII,S$GLB,, | Performed by: PHYSICIAN ASSISTANT

## 2022-06-21 PROCEDURE — 3075F PR MOST RECENT SYSTOLIC BLOOD PRESS GE 130-139MM HG: ICD-10-PCS | Mod: CPTII,S$GLB,, | Performed by: PHYSICIAN ASSISTANT

## 2022-06-21 PROCEDURE — 4010F PR ACE/ARB THEARPY RXD/TAKEN: ICD-10-PCS | Mod: CPTII,S$GLB,, | Performed by: PHYSICIAN ASSISTANT

## 2022-06-21 PROCEDURE — 99999 PR PBB SHADOW E&M-EST. PATIENT-LVL III: ICD-10-PCS | Mod: PBBFAC,,, | Performed by: PHYSICIAN ASSISTANT

## 2022-06-21 PROCEDURE — 99499 NO LOS: ICD-10-PCS | Mod: S$GLB,,, | Performed by: PHYSICIAN ASSISTANT

## 2022-06-21 PROCEDURE — 3079F PR MOST RECENT DIASTOLIC BLOOD PRESSURE 80-89 MM HG: ICD-10-PCS | Mod: CPTII,S$GLB,, | Performed by: PHYSICIAN ASSISTANT

## 2022-06-21 PROCEDURE — 20610 DRAIN/INJ JOINT/BURSA W/O US: CPT | Mod: LT,S$GLB,, | Performed by: PHYSICIAN ASSISTANT

## 2022-06-21 PROCEDURE — 4010F ACE/ARB THERAPY RXD/TAKEN: CPT | Mod: CPTII,S$GLB,, | Performed by: PHYSICIAN ASSISTANT

## 2022-06-21 PROCEDURE — 1160F RVW MEDS BY RX/DR IN RCRD: CPT | Mod: CPTII,S$GLB,, | Performed by: PHYSICIAN ASSISTANT

## 2022-06-21 PROCEDURE — 1159F MED LIST DOCD IN RCRD: CPT | Mod: CPTII,S$GLB,, | Performed by: PHYSICIAN ASSISTANT

## 2022-06-21 PROCEDURE — 99999 PR PBB SHADOW E&M-EST. PATIENT-LVL III: CPT | Mod: PBBFAC,,, | Performed by: PHYSICIAN ASSISTANT

## 2022-06-21 PROCEDURE — 3079F DIAST BP 80-89 MM HG: CPT | Mod: CPTII,S$GLB,, | Performed by: PHYSICIAN ASSISTANT

## 2022-06-21 PROCEDURE — 3008F BODY MASS INDEX DOCD: CPT | Mod: CPTII,S$GLB,, | Performed by: PHYSICIAN ASSISTANT

## 2022-06-21 PROCEDURE — 3008F PR BODY MASS INDEX (BMI) DOCUMENTED: ICD-10-PCS | Mod: CPTII,S$GLB,, | Performed by: PHYSICIAN ASSISTANT

## 2022-06-21 PROCEDURE — 1159F PR MEDICATION LIST DOCUMENTED IN MEDICAL RECORD: ICD-10-PCS | Mod: CPTII,S$GLB,, | Performed by: PHYSICIAN ASSISTANT

## 2022-06-21 RX ORDER — TRIAMCINOLONE ACETONIDE 40 MG/ML
40 INJECTION, SUSPENSION INTRA-ARTICULAR; INTRAMUSCULAR
Status: DISCONTINUED | OUTPATIENT
Start: 2022-06-21 | End: 2022-06-21 | Stop reason: HOSPADM

## 2022-06-21 RX ADMIN — TRIAMCINOLONE ACETONIDE 40 MG: 40 INJECTION, SUSPENSION INTRA-ARTICULAR; INTRAMUSCULAR at 09:06

## 2022-06-21 NOTE — PROCEDURES
Large Joint Aspiration/Injection: L knee    Date/Time: 6/21/2022 9:00 AM  Performed by: Jamaica Urbina PA-C  Authorized by: Jamaica Urbina PA-C     Consent Done?:  Yes (Verbal)  Indications:  Pain  Site marked: the procedure site was marked    Timeout: prior to procedure the correct patient, procedure, and site was verified    Prep: patient was prepped and draped in usual sterile fashion    Local anesthesia used?: No    Local anesthetic:  Topical anesthetic and lidocaine 1% without epinephrine    Details:  Needle Size:  22 G  Ultrasonic Guidance for needle placement?: No    Approach:  Anterolateral  Location:  Knee  Site:  L knee  Medications:  40 mg triamcinolone acetonide 40 mg/mL  Aspirate amount (mL):  65  Aspirate:  Blood-tinged  Patient tolerance:  Patient tolerated the procedure well with no immediate complications     Patient had immediate relief following aspiration and Kenalog injection.

## 2022-06-21 NOTE — PROGRESS NOTES
Subjective:      Patient ID: Joshua Luna is a 51 y.o. male.    Chief Complaint: Pain and Swelling of the Left Knee      51-year-old male follow-up left knee osteoarthritis.  States he had a Synvisc-One injection last week and has had increasing swelling and pain.  He states his pain is worse than prior to the injection.  He has gotten Synvisc One injections in the past but has never had this type of reaction.  He is having pain with flexion of his knee.  Does not note redness or warmth of the knee.      Review of Systems   Constitutional: Negative for chills and fever.   Cardiovascular: Negative for chest pain.   Respiratory: Negative for cough.    Hematologic/Lymphatic: Does not bruise/bleed easily.   Skin: Negative for poor wound healing and rash.   Musculoskeletal: Positive for joint pain, joint swelling, myalgias and stiffness.   Gastrointestinal: Negative for abdominal pain.   Genitourinary: Negative for bladder incontinence.   Neurological: Negative for dizziness, loss of balance and weakness.   Psychiatric/Behavioral: Negative for altered mental status.       Review of patient's allergies indicates:  No Known Allergies     Current Outpatient Medications   Medication Sig Dispense Refill    diclofenac sodium (VOLTAREN) 1 % Gel Apply 2 g topically 3 (three) times daily. 100 g 2    ipratropium (ATROVENT) 21 mcg (0.03 %) nasal spray 2 sprays by Nasal route 3 (three) times daily. 90 mL 3    omeprazole (PRILOSEC) 40 MG capsule Take 1 capsule (40 mg total) by mouth once daily. 30 capsule 11    valsartan (DIOVAN) 80 MG tablet Take 1 tablet (80 mg total) by mouth once daily. 90 tablet 3    albuterol (VENTOLIN HFA) 90 mcg/actuation inhaler Inhale 2 puffs into the lungs every 6 (six) hours as needed for Wheezing. Rescue (Patient not taking: No sig reported) 18 g 0     No current facility-administered medications for this visit.        The patient's relevant past medical, surgical, and social history was reviewed in  "Epic.       Objective:      VITAL SIGNS: /81 (BP Location: Left arm, Patient Position: Sitting, BP Method: X-Large (Automatic))   Pulse 88   Ht 5' 10" (1.778 m)   Wt 93.9 kg (207 lb 2 oz)   BMI 29.72 kg/m²     General    Nursing note and vitals reviewed.  Constitutional: He is oriented to person, place, and time. He appears well-developed and well-nourished.   Neurological: He is alert and oriented to person, place, and time.             Left Knee Exam     Inspection   Effusion: present           Assessment:       1. Primary osteoarthritis of left knee          Plan:         Joshua was seen today for pain and swelling.    Diagnoses and all orders for this visit:    Primary osteoarthritis of left knee  -     Large Joint Aspiration/Injection: L knee    65 cc aspirated from left knee today.  Kenalog injected into the knee.  Patient had immediate relief.  Follow-up at any time.          Jamaica Urbina PA-C   06/21/2022    "

## 2022-10-11 ENCOUNTER — CLINICAL SUPPORT (OUTPATIENT)
Dept: OTHER | Facility: CLINIC | Age: 52
End: 2022-10-11
Payer: COMMERCIAL

## 2022-10-11 DIAGNOSIS — Z00.8 ENCOUNTER FOR OTHER GENERAL EXAMINATION: ICD-10-CM

## 2022-10-11 PROCEDURE — 99401 PREV MED CNSL INDIV APPRX 15: CPT | Mod: S$GLB,,, | Performed by: INTERNAL MEDICINE

## 2022-10-11 PROCEDURE — 80061 PR  LIPID PANEL: ICD-10-PCS | Mod: QW,S$GLB,, | Performed by: INTERNAL MEDICINE

## 2022-10-11 PROCEDURE — 82947 ASSAY GLUCOSE BLOOD QUANT: CPT | Mod: QW,S$GLB,, | Performed by: INTERNAL MEDICINE

## 2022-10-11 PROCEDURE — 99401 PR PREVENT COUNSEL,INDIV,15 MIN: ICD-10-PCS | Mod: S$GLB,,, | Performed by: INTERNAL MEDICINE

## 2022-10-11 PROCEDURE — 82947 PR  ASSAY QUANTITATIVE,BLOOD GLUCOSE: ICD-10-PCS | Mod: QW,S$GLB,, | Performed by: INTERNAL MEDICINE

## 2022-10-11 PROCEDURE — 80061 LIPID PANEL: CPT | Mod: QW,S$GLB,, | Performed by: INTERNAL MEDICINE

## 2022-10-24 LAB
GLUCOSE SERPL-MCNC: 124 MG/DL (ref 60–140)
HDLC SERPL-MCNC: 60 MG/DL
POC CHOLESTEROL, LDL (DOCK): 63 MG/DL
POC CHOLESTEROL, TOTAL: 140 MG/DL
TRIGL SERPL-MCNC: 90 MG/DL

## 2022-10-29 VITALS
BODY MASS INDEX: 29.92 KG/M2 | SYSTOLIC BLOOD PRESSURE: 143 MMHG | DIASTOLIC BLOOD PRESSURE: 88 MMHG | WEIGHT: 202 LBS | HEIGHT: 69 IN

## 2023-01-05 ENCOUNTER — OFFICE VISIT (OUTPATIENT)
Dept: FAMILY MEDICINE | Facility: CLINIC | Age: 53
End: 2023-01-05
Payer: COMMERCIAL

## 2023-01-05 VITALS
SYSTOLIC BLOOD PRESSURE: 124 MMHG | HEART RATE: 75 BPM | TEMPERATURE: 98 F | OXYGEN SATURATION: 97 % | HEIGHT: 69 IN | WEIGHT: 205.94 LBS | DIASTOLIC BLOOD PRESSURE: 78 MMHG | BODY MASS INDEX: 30.5 KG/M2

## 2023-01-05 DIAGNOSIS — Z00.00 ROUTINE HEALTH MAINTENANCE: Primary | ICD-10-CM

## 2023-01-05 PROCEDURE — 3074F SYST BP LT 130 MM HG: CPT | Mod: CPTII,S$GLB,, | Performed by: FAMILY MEDICINE

## 2023-01-05 PROCEDURE — 3008F PR BODY MASS INDEX (BMI) DOCUMENTED: ICD-10-PCS | Mod: CPTII,S$GLB,, | Performed by: FAMILY MEDICINE

## 2023-01-05 PROCEDURE — 90750 HZV VACC RECOMBINANT IM: CPT | Mod: S$GLB,,, | Performed by: FAMILY MEDICINE

## 2023-01-05 PROCEDURE — 3078F PR MOST RECENT DIASTOLIC BLOOD PRESSURE < 80 MM HG: ICD-10-PCS | Mod: CPTII,S$GLB,, | Performed by: FAMILY MEDICINE

## 2023-01-05 PROCEDURE — 3078F DIAST BP <80 MM HG: CPT | Mod: CPTII,S$GLB,, | Performed by: FAMILY MEDICINE

## 2023-01-05 PROCEDURE — 99396 PREV VISIT EST AGE 40-64: CPT | Mod: 25,S$GLB,, | Performed by: FAMILY MEDICINE

## 2023-01-05 PROCEDURE — 90471 IMMUNIZATION ADMIN: CPT | Mod: S$GLB,,, | Performed by: FAMILY MEDICINE

## 2023-01-05 PROCEDURE — 1159F MED LIST DOCD IN RCRD: CPT | Mod: CPTII,S$GLB,, | Performed by: FAMILY MEDICINE

## 2023-01-05 PROCEDURE — 99396 PR PREVENTIVE VISIT,EST,40-64: ICD-10-PCS | Mod: 25,S$GLB,, | Performed by: FAMILY MEDICINE

## 2023-01-05 PROCEDURE — 1159F PR MEDICATION LIST DOCUMENTED IN MEDICAL RECORD: ICD-10-PCS | Mod: CPTII,S$GLB,, | Performed by: FAMILY MEDICINE

## 2023-01-05 PROCEDURE — 3008F BODY MASS INDEX DOCD: CPT | Mod: CPTII,S$GLB,, | Performed by: FAMILY MEDICINE

## 2023-01-05 PROCEDURE — 90471 ZOSTER RECOMBINANT VACCINE: ICD-10-PCS | Mod: S$GLB,,, | Performed by: FAMILY MEDICINE

## 2023-01-05 PROCEDURE — 3074F PR MOST RECENT SYSTOLIC BLOOD PRESSURE < 130 MM HG: ICD-10-PCS | Mod: CPTII,S$GLB,, | Performed by: FAMILY MEDICINE

## 2023-01-05 PROCEDURE — 90750 ZOSTER RECOMBINANT VACCINE: ICD-10-PCS | Mod: S$GLB,,, | Performed by: FAMILY MEDICINE

## 2023-01-05 RX ORDER — VALSARTAN 80 MG/1
80 TABLET ORAL DAILY
Qty: 90 TABLET | Refills: 3 | Status: SHIPPED | OUTPATIENT
Start: 2023-01-05 | End: 2023-01-09 | Stop reason: SDUPTHER

## 2023-01-05 RX ORDER — OMEPRAZOLE 40 MG/1
40 CAPSULE, DELAYED RELEASE ORAL DAILY
Qty: 90 CAPSULE | Refills: 3 | Status: SHIPPED | OUTPATIENT
Start: 2023-01-05 | End: 2023-01-09 | Stop reason: SDUPTHER

## 2023-01-05 RX ORDER — IPRATROPIUM BROMIDE 21 UG/1
2 SPRAY, METERED NASAL 3 TIMES DAILY
Qty: 90 ML | Refills: 3 | Status: SHIPPED | OUTPATIENT
Start: 2023-01-05 | End: 2023-01-09 | Stop reason: SDUPTHER

## 2023-01-09 ENCOUNTER — PATIENT MESSAGE (OUTPATIENT)
Dept: FAMILY MEDICINE | Facility: CLINIC | Age: 53
End: 2023-01-09

## 2023-01-09 RX ORDER — IPRATROPIUM BROMIDE 21 UG/1
2 SPRAY, METERED NASAL 3 TIMES DAILY
Qty: 90 ML | Refills: 3 | Status: SHIPPED | OUTPATIENT
Start: 2023-01-09 | End: 2024-02-16 | Stop reason: SDUPTHER

## 2023-01-09 RX ORDER — OMEPRAZOLE 40 MG/1
40 CAPSULE, DELAYED RELEASE ORAL DAILY
Qty: 90 CAPSULE | Refills: 3 | Status: SHIPPED | OUTPATIENT
Start: 2023-01-09

## 2023-01-09 RX ORDER — VALSARTAN 80 MG/1
80 TABLET ORAL DAILY
Qty: 90 TABLET | Refills: 3 | Status: SHIPPED | OUTPATIENT
Start: 2023-01-09 | End: 2024-01-19 | Stop reason: SDUPTHER

## 2023-01-09 NOTE — PROGRESS NOTES
" Patient ID: Joshua Luna is a 52 y.o. male.    Chief Complaint: Annual Exam    HPI      Joshua Luna is a 52 y.o. male. here for annual exam.   No current complaints.        Review of Symptoms    Constitutional: Negative.    HENT: Negative.    Eyes: Negative.    Respiratory: Negative.    Cardiovascular: Negative.    Gastrointestinal: Negative.    Endocrine: Negative.    Genitourinary: Negative.    Musculoskeletal: Negative.    Skin: Negative.    Allergic/Immunologic: Negative.    Neurological: Negative.    Hematological: Negative.    Psychiatric/Behavioral: Negative.      Except as above in HPI      Vitals:    01/05/23 0945   BP: 124/78   BP Location: Right arm   Patient Position: Sitting   Pulse: 75   Temp: 97.8 °F (36.6 °C)   TempSrc: Oral   SpO2: 97%   Weight: 93.4 kg (205 lb 14.6 oz)   Height: 5' 9" (1.753 m)        Physical  Exam      Constitutional:  Oriented to person, place, and time. Appears well-developed and well-nourished.     HENT:   Head: Normocephalic and atraumatic.     Right Ear: Tympanic membrane, ear canal and External ear normal     Left Ear: Tympanic membrane, ear canal and External ear normal     Nose: Nose normal. No rhinorrhea or nasal deformity.     Mouth/Throat: Uvula is midline, oropharynx is clear and moist and mucous membranes are normal.      Eyes: Conjunctivae are normal. Right eye exhibits no discharge. Left eye exhibits no discharge. No scleral icterus.     Neck:  No JVD present. No tracheal deviation  []  Neck supple.   []  No Carotid bruit    Cardiovascular:  Regular rate and rhythm with normal S1 and S2     Pulmonary/Chest:   Clear to auscultation bilaterally without wheezes, rhonchi or rales    Musculoskeletal: Normal range of motion. No edema or tenderness.   No deformity     Lymphadenopathy:  No cervical adenopathy.     Neurological:  Alert and oriented to person, place, and time. Coordination normal.     Skin: Skin is warm and dry. No rash noted.     Psychiatric: Normal mood and " affect. Speech is normal and behavior is normal. Judgment and thought content normal.     Complete Blood Count  Lab Results   Component Value Date    RBC 4.53 (L) 01/06/2021    HGB 14.7 01/06/2021    HCT 45.4 01/06/2021     (H) 01/06/2021    MCH 32.5 (H) 01/06/2021    MCHC 32.4 01/06/2021    RDW 12.2 01/06/2021     01/06/2021    MPV 10.4 01/06/2021    GRAN 2.7 01/06/2021    GRAN 51.9 01/06/2021    LYMPH 1.3 01/06/2021    LYMPH 25.6 01/06/2021    MONO 0.9 01/06/2021    MONO 17.0 (H) 01/06/2021    EOS 0.2 01/06/2021    BASO 0.04 01/06/2021    EOSINOPHIL 4.3 01/06/2021    BASOPHIL 0.8 01/06/2021    DIFFMETHOD Automated 01/06/2021       Comprehensive Metabolic Panel  No results found for: GLU, BUN, CREATININE, NA, K, CL, PROT, ALBUMIN, BILITOT, AST, ALKPHOS, CO2, ALT, ANIONGAP, EGFRNONAA, ESTGFRAFRICA    TSH  No results found for: TSH    Assessment / Plan:      ICD-10-CM ICD-9-CM   1. Routine health maintenance  Z00.00 V70.0     Routine health maintenance  -     Comprehensive Metabolic Panel; Future  -     CBC Auto Differential; Future  -     Lipid Panel; Future  -     TSH; Future  -     Hemoglobin A1C; Future; Expected date: 01/05/2023  -     (In Office Administered) Zoster Recombinant Vaccine    Other orders  -     valsartan (DIOVAN) 80 MG tablet; Take 1 tablet (80 mg total) by mouth once daily.  Dispense: 90 tablet; Refill: 3  -     ipratropium (ATROVENT) 21 mcg (0.03 %) nasal spray; 2 sprays by Each Nostril route 3 (three) times daily.  Dispense: 90 mL; Refill: 3  -     omeprazole (PRILOSEC) 40 MG capsule; Take 1 capsule (40 mg total) by mouth once daily. As needed.  Dispense: 90 capsule; Refill: 3          Discussed how to stay healthy including: diet, and exercise.

## 2023-01-10 NOTE — PATIENT INSTRUCTIONS
Understanding Sinus Problems    You dont often think about your sinuses until theres a problem. One day you realize you cant smell dinner cooking. Or you find you often have headaches or problems breathing through your nose.  Symptoms of sinus problems  Sinus problems can cause uncomfortable symptoms. Your nose may run constantly. You might have trouble sleeping at night. You may even lose your sense of smell. Other symptoms can include:  · Nasal congestion  · Fullness in ears  · Green, yellow, or bloody drainage from the nose  · Trouble tasting food  · Frequent headaches  · Facial pain  · Cough  When sinuses are blocked  If something blocks the passages in the nose or sinuses, mucus cant drain. Mucus-filled sinuses often become infected.  · Colds cause the lining of the nose and sinuses to swell and make extra mucus. A buildup of mucus can lead to a more serious infection.  · Allergies irritate turbinates and other tissues. This causes swelling, which can cause a blockage. Over time, this irritation can also lead to sacs of swollen tissue (polyps).  · Polyps may form in both the sinuses and nose. Polyps can grow large enough to clog nasal passages and block drainage.  · A crooked (deviated) septum may block nasal passages. This is often the result of an injury.  Date Last Reviewed: 11/1/2016  © 8731-9598 [employer] [address] [place] [address], [place] [address]. All rights reserved. This information is not intended as a substitute for professional medical care. Always follow your healthcare professional's instructions.         Asc Procedure Text (A): After obtaining clear surgical margins the patient was sent to an ASC for surgical repair.  The patient understands they will receive post-surgical care and follow-up from the ASC physician.

## 2023-02-22 ENCOUNTER — OFFICE VISIT (OUTPATIENT)
Dept: ORTHOPEDICS | Facility: CLINIC | Age: 53
End: 2023-02-22
Payer: COMMERCIAL

## 2023-02-22 VITALS
HEART RATE: 93 BPM | SYSTOLIC BLOOD PRESSURE: 131 MMHG | WEIGHT: 205.69 LBS | DIASTOLIC BLOOD PRESSURE: 85 MMHG | BODY MASS INDEX: 30.47 KG/M2 | HEIGHT: 69 IN

## 2023-02-22 DIAGNOSIS — M17.12 PRIMARY OSTEOARTHRITIS OF LEFT KNEE: Primary | ICD-10-CM

## 2023-02-22 PROCEDURE — 99999 PR PBB SHADOW E&M-EST. PATIENT-LVL III: ICD-10-PCS | Mod: PBBFAC,,, | Performed by: ORTHOPAEDIC SURGERY

## 2023-02-22 PROCEDURE — 20610 LARGE JOINT ASPIRATION/INJECTION: L KNEE: ICD-10-PCS | Mod: LT,S$GLB,, | Performed by: ORTHOPAEDIC SURGERY

## 2023-02-22 PROCEDURE — 3075F SYST BP GE 130 - 139MM HG: CPT | Mod: CPTII,S$GLB,, | Performed by: ORTHOPAEDIC SURGERY

## 2023-02-22 PROCEDURE — 99999 PR PBB SHADOW E&M-EST. PATIENT-LVL III: CPT | Mod: PBBFAC,,, | Performed by: ORTHOPAEDIC SURGERY

## 2023-02-22 PROCEDURE — 3075F PR MOST RECENT SYSTOLIC BLOOD PRESS GE 130-139MM HG: ICD-10-PCS | Mod: CPTII,S$GLB,, | Performed by: ORTHOPAEDIC SURGERY

## 2023-02-22 PROCEDURE — 4010F PR ACE/ARB THEARPY RXD/TAKEN: ICD-10-PCS | Mod: CPTII,S$GLB,, | Performed by: ORTHOPAEDIC SURGERY

## 2023-02-22 PROCEDURE — 3044F HG A1C LEVEL LT 7.0%: CPT | Mod: CPTII,S$GLB,, | Performed by: ORTHOPAEDIC SURGERY

## 2023-02-22 PROCEDURE — 3079F PR MOST RECENT DIASTOLIC BLOOD PRESSURE 80-89 MM HG: ICD-10-PCS | Mod: CPTII,S$GLB,, | Performed by: ORTHOPAEDIC SURGERY

## 2023-02-22 PROCEDURE — 20610 DRAIN/INJ JOINT/BURSA W/O US: CPT | Mod: LT,S$GLB,, | Performed by: ORTHOPAEDIC SURGERY

## 2023-02-22 PROCEDURE — 3008F BODY MASS INDEX DOCD: CPT | Mod: CPTII,S$GLB,, | Performed by: ORTHOPAEDIC SURGERY

## 2023-02-22 PROCEDURE — 1159F MED LIST DOCD IN RCRD: CPT | Mod: CPTII,S$GLB,, | Performed by: ORTHOPAEDIC SURGERY

## 2023-02-22 PROCEDURE — 3079F DIAST BP 80-89 MM HG: CPT | Mod: CPTII,S$GLB,, | Performed by: ORTHOPAEDIC SURGERY

## 2023-02-22 PROCEDURE — 1159F PR MEDICATION LIST DOCUMENTED IN MEDICAL RECORD: ICD-10-PCS | Mod: CPTII,S$GLB,, | Performed by: ORTHOPAEDIC SURGERY

## 2023-02-22 PROCEDURE — 3044F PR MOST RECENT HEMOGLOBIN A1C LEVEL <7.0%: ICD-10-PCS | Mod: CPTII,S$GLB,, | Performed by: ORTHOPAEDIC SURGERY

## 2023-02-22 PROCEDURE — 99214 OFFICE O/P EST MOD 30 MIN: CPT | Mod: 25,S$GLB,, | Performed by: ORTHOPAEDIC SURGERY

## 2023-02-22 PROCEDURE — 99214 PR OFFICE/OUTPT VISIT, EST, LEVL IV, 30-39 MIN: ICD-10-PCS | Mod: 25,S$GLB,, | Performed by: ORTHOPAEDIC SURGERY

## 2023-02-22 PROCEDURE — 4010F ACE/ARB THERAPY RXD/TAKEN: CPT | Mod: CPTII,S$GLB,, | Performed by: ORTHOPAEDIC SURGERY

## 2023-02-22 PROCEDURE — 3008F PR BODY MASS INDEX (BMI) DOCUMENTED: ICD-10-PCS | Mod: CPTII,S$GLB,, | Performed by: ORTHOPAEDIC SURGERY

## 2023-02-22 RX ORDER — TRIAMCINOLONE ACETONIDE 40 MG/ML
40 INJECTION, SUSPENSION INTRA-ARTICULAR; INTRAMUSCULAR
Status: SHIPPED | OUTPATIENT
Start: 2023-02-22

## 2023-02-22 RX ADMIN — TRIAMCINOLONE ACETONIDE 40 MG: 40 INJECTION, SUSPENSION INTRA-ARTICULAR; INTRAMUSCULAR at 10:02

## 2023-02-22 NOTE — PROGRESS NOTES
[facility]  [facility]    Established Patient Knee Office Visit  02/22/2023     Diagnosis:  Left knee osteoarthritis     Procedure:   (6/16/2022) left knee gel injection      Subjective:      Joshua Luna is a 52 y.o. male who returns for evaluation and treatment of the left knee.    The patient has the following symptoms: pain located medial joint line left knee . The symptoms went away for about 8 months then returned about 1 week ago.     Had gel last visit which gave relief until 1 week ago.  No other new symptoms.      Outside reports reviewed: historical medical records and office notes.    Past Medical History:   Diagnosis Date    Arthritis     Dry eyes     Dry mouth     Hypertension     Personal history of colonic polyps 12/17/2021       Patient Active Problem List   Diagnosis    Other and unspecified nonspecific immunological findings    Fatigue    Joint pain    Personal history of colonic polyps    Primary osteoarthritis of left knee       Past Surgical History:   Procedure Laterality Date    BONE GRAFT  06/2021    COLONOSCOPY N/A 12/17/2021    Procedure: COLONOSCOPY;  Surgeon: Yumiko Donald MD;  Location: [facility];  Service: Endoscopy;  Laterality: N/A;    ELBOW SURGERY  2006    right surgery    KNEE SURGERY  2009    5    ROTATOR CUFF REPAIR  [id number]    right shoulder    VASECTOMY  2003        Current Outpatient Medications   Medication Instructions    diclofenac sodium (VOLTAREN) 2 g, Topical (Top), 3 times daily    ipratropium (ATROVENT) 21 mcg (0.03 %) nasal spray Use 2 sprays by Each Nostril route 3 (three) times daily.    omeprazole (PRILOSEC) 40 mg, Oral, Daily, As needed.    valsartan (DIOVAN) 80 mg, Oral, Daily        Review of patient's allergies indicates:  No Known Allergies    Social History     Socioeconomic History    Marital status:    Tobacco Use    Smoking status: Never    Smokeless tobacco: Never   Substance and  Sexual Activity    Alcohol use: Yes     Comment: occasional    Drug use: No       Family History   Problem Relation Age of Onset    Cancer Mother         Breast & colon    Arthritis Mother     Bell's palsy Mother     Rheum arthritis Mother     Diabetes Father     Arthritis Father     Pulmonary fibrosis Father     Hypertension Father     Arthritis Sister     Asthma Daughter     Eczema Daughter     Asthma Son     Narcolepsy Sister     No Known Problems Sister     No Known Problems Sister     No Known Problems Sister          Review of Systems   Constitutional: Negative for chills and fever.   HENT:  Negative for hearing loss.    Eyes:  Negative for blurred vision.   Cardiovascular:  Negative for chest pain.   Respiratory:  Negative for shortness of breath.    Gastrointestinal:  Negative for abdominal pain.   Neurological:  Negative for light-headedness.        Objective:      General    Constitutional: He is oriented to person, place, and time. He appears well-developed and well-nourished.   HENT:   Head: Normocephalic and atraumatic.   Eyes: EOM are normal.   Cardiovascular:  Normal rate.            Pulmonary/Chest: Effort normal.   Neurological: He is alert and oriented to person, place, and time.   Psychiatric: He has a normal mood and affect.           Right Knee Exam     Inspection   Erythema: absent  Swelling: absent  Effusion: absent    Tenderness   The patient is experiencing no tenderness.     Range of Motion   Extension:  0   Flexion:  130     Tests   Meniscus   Hilary:  Medial - negative Lateral - negative  Ligament Examination   Lachman: normal (-1 to 2mm)   PCL-Posterior Drawer: normal (0 to 2mm)     MCL - Valgus: normal (0 to 2mm)  LCL - Varus: normal  Patella   Patellar apprehension: negative    Other   Sensation: normal    Left Knee Exam     Inspection   Erythema: absent  Swelling: absent  Effusion: absent    Tenderness   The patient tender to palpation of the lateral joint line and medial joint  line.    Crepitus   The patient has crepitus of the patella and lateral joint line.    Range of Motion   Extension:  0   Flexion:  120     Tests   Meniscus   Hilary:  Medial - negative Lateral - negative  Stability   Lachman: normal (-1 to 2mm)   PCL-Posterior Drawer: normal (0 to 2mm)  MCL - Valgus: normal (0 to 2mm)  LCL - Varus: normal (0 to 2mm)  Patella   Patellar apprehension: negative    Other   Sensation: normal    Muscle Strength   Right Lower Extremity   Quadriceps:  5/5   Hamstrin/5   Left Lower Extremity   Quadriceps:  5/5   Hamstrin/5     Vascular Exam     Right Pulses    Posterior Tibial:      2+        Left Pulses    Posterior Tibial:      2+        Imaging:  None today    Large Joint Aspiration/Injection: L knee    Date/Time: 2023 10:45 AM  Performed by: Bob Casas IV, MD  Authorized by: Bob Casas IV, MD     Consent Done?:  Yes (Verbal)  Indications:  Pain  Site marked: the procedure site was marked    Timeout: prior to procedure the correct patient, procedure, and site was verified    Prep: patient was prepped and draped in usual sterile fashion      Local anesthesia used?: Yes    Local anesthetic:  Lidocaine 1% without epinephrine    Details:  Needle Size:  22 G  Ultrasonic Guidance for needle placement?: No    Approach:  Anterolateral  Location:  Knee  Site:  L knee  Medications:  40 mg triamcinolone acetonide 40 mg/mL  Patient tolerance:  Patient tolerated the procedure well with no immediate complications        Assessment:       Joshua Luna is a 52 y.o. male seen in the office today. The encounter diagnosis was Primary osteoarthritis of left knee.  Non-operative treatment is recommended at this time.  CSI given today and home exercise program given.  Sounds like flared up symptoms with increased activity recently, so selected CSI instead of repeat gel injection to decrease inflammation related pain.   The natural history and expected course discussed with patient. Various  treatment options were discussed, including their risks and benefits. All of the patient's questions were answered.       Plan:      Physical therapy and rehabilitation treatment.  Tylenol 650mg TID, PRN pain.  Follow up as needed if symptoms worsen.  Corticosteroid injection given today (left knee).         Bob Casas IV, MD   of Clinical Orthopedics  Department of Orthopedic Surgery  [facility]  Office: [phone]  Website: [email or web address]    ---------------------------------------  Orders Placed This Encounter   Procedures    Large Joint Aspiration/Injection

## 2023-03-08 ENCOUNTER — CLINICAL SUPPORT (OUTPATIENT)
Dept: FAMILY MEDICINE | Facility: CLINIC | Age: 53
End: 2023-03-08
Payer: COMMERCIAL

## 2023-03-08 DIAGNOSIS — Z23 NEED FOR SHINGLES VACCINE: Primary | ICD-10-CM

## 2023-03-08 PROCEDURE — 90750 HZV VACC RECOMBINANT IM: CPT | Mod: S$GLB,,, | Performed by: FAMILY MEDICINE

## 2023-03-08 PROCEDURE — 90471 ZOSTER RECOMBINANT VACCINE: ICD-10-PCS | Mod: S$GLB,,, | Performed by: FAMILY MEDICINE

## 2023-03-08 PROCEDURE — 90471 IMMUNIZATION ADMIN: CPT | Mod: S$GLB,,, | Performed by: FAMILY MEDICINE

## 2023-03-08 PROCEDURE — 90750 ZOSTER RECOMBINANT VACCINE: ICD-10-PCS | Mod: S$GLB,,, | Performed by: FAMILY MEDICINE

## 2023-05-11 ENCOUNTER — PATIENT MESSAGE (OUTPATIENT)
Dept: FAMILY MEDICINE | Facility: CLINIC | Age: 53
End: 2023-05-11

## 2023-05-11 DIAGNOSIS — R39.198 SUBJECTIVE CHANGE IN URINATION: ICD-10-CM

## 2023-05-11 DIAGNOSIS — Z00.00 ROUTINE HEALTH MAINTENANCE: ICD-10-CM

## 2023-05-11 DIAGNOSIS — R39.9 UTI SYMPTOMS: Primary | ICD-10-CM

## 2023-05-12 ENCOUNTER — PATIENT MESSAGE (OUTPATIENT)
Dept: FAMILY MEDICINE | Facility: CLINIC | Age: 53
End: 2023-05-12

## 2023-05-15 ENCOUNTER — PATIENT MESSAGE (OUTPATIENT)
Dept: FAMILY MEDICINE | Facility: CLINIC | Age: 53
End: 2023-05-15

## 2023-07-03 ENCOUNTER — OFFICE VISIT (OUTPATIENT)
Dept: URGENT CARE | Facility: CLINIC | Age: 53
End: 2023-07-03
Payer: COMMERCIAL

## 2023-07-03 VITALS
TEMPERATURE: 99 F | WEIGHT: 195 LBS | OXYGEN SATURATION: 98 % | HEART RATE: 96 BPM | DIASTOLIC BLOOD PRESSURE: 78 MMHG | BODY MASS INDEX: 28.88 KG/M2 | RESPIRATION RATE: 19 BRPM | HEIGHT: 69 IN | SYSTOLIC BLOOD PRESSURE: 142 MMHG

## 2023-07-03 DIAGNOSIS — R06.2 WHEEZING: ICD-10-CM

## 2023-07-03 DIAGNOSIS — J06.9 URI WITH COUGH AND CONGESTION: Primary | ICD-10-CM

## 2023-07-03 DIAGNOSIS — I10 HYPERTENSION, UNSPECIFIED TYPE: ICD-10-CM

## 2023-07-03 DIAGNOSIS — R05.9 COUGH, UNSPECIFIED TYPE: ICD-10-CM

## 2023-07-03 LAB
CTP QC/QA: YES
SARS-COV-2 AG RESP QL IA.RAPID: NEGATIVE

## 2023-07-03 PROCEDURE — 99213 OFFICE O/P EST LOW 20 MIN: CPT | Mod: S$GLB,,, | Performed by: PHYSICIAN ASSISTANT

## 2023-07-03 PROCEDURE — 87811 SARS CORONAVIRUS 2 ANTIGEN POCT, MANUAL READ: ICD-10-PCS | Mod: QW,S$GLB,, | Performed by: PHYSICIAN ASSISTANT

## 2023-07-03 PROCEDURE — 99213 PR OFFICE/OUTPT VISIT, EST, LEVL III, 20-29 MIN: ICD-10-PCS | Mod: S$GLB,,, | Performed by: PHYSICIAN ASSISTANT

## 2023-07-03 PROCEDURE — 87811 SARS-COV-2 COVID19 W/OPTIC: CPT | Mod: QW,S$GLB,, | Performed by: PHYSICIAN ASSISTANT

## 2023-07-03 RX ORDER — ALBUTEROL SULFATE 90 UG/1
2 AEROSOL, METERED RESPIRATORY (INHALATION) EVERY 6 HOURS PRN
Qty: 6.7 G | Refills: 0 | Status: SHIPPED | OUTPATIENT
Start: 2023-07-03

## 2023-07-03 RX ORDER — PROMETHAZINE HYDROCHLORIDE AND DEXTROMETHORPHAN HYDROBROMIDE 6.25; 15 MG/5ML; MG/5ML
5 SYRUP ORAL EVERY 4 HOURS PRN
Qty: 180 ML | Refills: 0 | Status: SHIPPED | OUTPATIENT
Start: 2023-07-03 | End: 2023-07-13

## 2023-07-03 RX ORDER — PREDNISONE 20 MG/1
40 TABLET ORAL DAILY
Qty: 10 TABLET | Refills: 0 | Status: SHIPPED | OUTPATIENT
Start: 2023-07-03 | End: 2023-07-08

## 2023-07-03 NOTE — PATIENT INSTRUCTIONS
You have been prescribed a steroid today. Take the prescription as directed. Steroids can increase blood sugar. You can also have the following when taking steroids: flushing, jitteriness, weight gain, fluid retention, bone weakening. If you develop any adverse symptoms, stop taking the medication immediately.    You must understand that you've received an Urgent Care treatment only and that you may be released before all your medical problems are known or treated. You, the patient, will arrange for follow up care as instructed.      Follow up with your PCP or specialty clinic as instructed in the next 2-3 days if not improved or as needed. You can call [phone] to schedule an appointment with appropriate provider.      If you condition worsens, we recommend that you receive another evaluation at the emergency room immediately or contact your primary medical clinic's after hours call service to discuss your concerns.      Please return here or go to the Emergency Department for any concerns or worsening condition.      If you were prescribed a narcotic or controlled substance, do not drive or operate heavy equipment or machinery while taking these medications.

## 2023-07-03 NOTE — PROGRESS NOTES
"Subjective:      Patient ID: Joshua Luna is a 52 y.o. male.    Vitals:  height is 5' 9" (1.753 m) and weight is 88.5 kg (195 lb). His oral temperature is 98.5 °F (36.9 °C). His blood pressure is 142/78 (abnormal) and his pulse is 96. His respiration is 19 and oxygen saturation is 98%.     Chief Complaint: Cough    Patient provider note starts here:  Patient presents with a 4-5 day history of cough, nasal congestion, fatigue, post nasal drip, irritated throat and intermittent wheezing. Denies fevers or SOB. Reports chest and thoracic back soreness from coughing so much. Taking Nyquil. Does not smoke tobacco. Sister in law recently ill on family vacation as well when his symptoms started.     Cough  This is a new problem. Episode onset: Thursday. The problem has been gradually worsening. The problem occurs constantly. The cough is Productive of sputum. Associated symptoms include headaches, myalgias, nasal congestion, postnasal drip, a sore throat, sweats and wheezing. Pertinent negatives include no chills, ear congestion, ear pain, fever, heartburn, hemoptysis, rash, rhinorrhea, shortness of breath or weight loss. He has tried OTC cough suppressant (Nyquil) for the symptoms. Improvement on treatment: a little.     Constitution: Negative for chills and fever.   HENT:  Positive for congestion, postnasal drip and sore throat. Negative for ear pain.    Neck: Negative for neck pain and neck stiffness.   Respiratory:  Positive for cough, sputum production and wheezing. Negative for chest tightness, bloody sputum, COPD, shortness of breath and asthma.    Gastrointestinal:  Negative for abdominal pain, vomiting, diarrhea and heartburn.   Musculoskeletal:  Positive for muscle ache. Negative for pain.   Skin:  Negative for rash and wound.   Allergic/Immunologic: Negative for asthma and itching.   Neurological:  Positive for headaches. Negative for numbness and tingling.    Objective:     Physical Exam   Constitutional: He is " oriented to person, place, and time. He appears well-developed. He is cooperative.  Non-toxic appearance. He does not appear ill. No distress.   HENT:   Head: Normocephalic and atraumatic.   Ears:   Right Ear: Hearing, tympanic membrane, external ear and ear canal normal.   Left Ear: Hearing, tympanic membrane, external ear and ear canal normal.   Nose: Congestion present. No mucosal edema, rhinorrhea or nasal deformity. No epistaxis. Right sinus exhibits no maxillary sinus tenderness and no frontal sinus tenderness. Left sinus exhibits no maxillary sinus tenderness and no frontal sinus tenderness.   Mouth/Throat: Uvula is midline, oropharynx is clear and moist and mucous membranes are normal. No trismus in the jaw. Normal dentition. No uvula swelling. No oropharyngeal exudate, posterior oropharyngeal edema or posterior oropharyngeal erythema.   Eyes: Conjunctivae and lids are normal. No scleral icterus.   Neck: Trachea normal and phonation normal. Neck supple. No edema present. No erythema present. No neck rigidity present.   Cardiovascular: Normal rate, regular rhythm, normal heart sounds and normal pulses.   Pulmonary/Chest: Effort normal. No respiratory distress. He has no decreased breath sounds. He has wheezes (Expiratory wheeze in the right upper lung field). He has no rhonchi.   Abdominal: Normal appearance.   Musculoskeletal: Normal range of motion.         General: No deformity. Normal range of motion.   Neurological: He is alert and oriented to person, place, and time. He exhibits normal muscle tone. Coordination normal.   Skin: Skin is warm, dry, intact, not diaphoretic and not pale.   Psychiatric: His speech is normal and behavior is normal. Judgment and thought content normal.   Nursing note and vitals reviewed.    Assessment:     1. URI with cough and congestion    2. Cough, unspecified type    3. Hypertension, unspecified type    4. Wheezing      Results for orders placed or performed in visit on  07/03/23   SARS Coronavirus 2 Antigen, POCT Manual Read   Result Value Ref Range    SARS Coronavirus 2 Antigen Negative Negative     Acceptable Yes        Plan:       URI with cough and congestion  -     promethazine-dextromethorphan (PROMETHAZINE-DM) 6.25-15 mg/5 mL Syrp; Take 5 mLs by mouth every 4 (four) hours as needed (COUGH).  Dispense: 180 mL; Refill: 0    Cough, unspecified type  -     SARS Coronavirus 2 Antigen, POCT Manual Read    Hypertension, unspecified type    Wheezing  -     albuterol (PROVENTIL HFA) 90 mcg/actuation inhaler; Inhale 2 puffs into the lungs every 6 (six) hours as needed for Wheezing or Shortness of Breath. Rescue  Dispense: 6.7 g; Refill: 0  -     predniSONE (DELTASONE) 20 MG tablet; Take 2 tablets (40 mg total) by mouth once daily. for 5 days  Dispense: 10 tablet; Refill: 0          Medical Decision Making:   History:   Old Medical Records: I decided to obtain old medical records.  Urgent Care Management:  A. Problem List:   -Acute: URI with cough and congestion, wheezing   -Chronic: Hypertension  B. Differential diagnosis: viral vs bacterial URI, pharyngitis, otitis, COVID 19, influenza, pneumonia, bronchitis  C. Diagnostic Testing Ordered: COVID  D. Diagnostic Testing Considered: CXR  E. Independent Historians:  F. Urgent Care Midlevel Independent Results Interpretation: COVID negative  G. Radiology:  H. Review of Previous Medical Records: Patient evaluated for routine health maintenance in Jan of this year and everything appeared stable.   I. Home Medications Reviewed  J. Social Determinants of Health considered  K. Medical Decision Making and Disposition: Patient presents with URI like symptoms and wheezing for the past 4-5 days. On exam, he is afebrile and nontoxic appearing. Vitals stable. Expiratory wheezing noted but no distress and not hypoxic. COVID is negative. He was advised close follow-up with PCP and ED precautions discussed, he verbalized understanding  and agreed with plan.     Additional MDM:     Heart Failure Score:   COPD = No    Patient Instructions   You have been prescribed a steroid today. Take the prescription as directed. Steroids can increase blood sugar. You can also have the following when taking steroids: flushing, jitteriness, weight gain, fluid retention, bone weakening. If you develop any adverse symptoms, stop taking the medication immediately.    You must understand that you've received an Urgent Care treatment only and that you may be released before all your medical problems are known or treated. You, the patient, will arrange for follow up care as instructed.      Follow up with your PCP or specialty clinic as instructed in the next 2-3 days if not improved or as needed. You can call [phone] to schedule an appointment with appropriate provider.      If you condition worsens, we recommend that you receive another evaluation at the emergency room immediately or contact your primary medical clinic's after hours call service to discuss your concerns.      Please return here or go to the Emergency Department for any concerns or worsening condition.      If you were prescribed a narcotic or controlled substance, do not drive or operate heavy equipment or machinery while taking these medications.

## 2023-07-06 ENCOUNTER — TELEPHONE (OUTPATIENT)
Dept: URGENT CARE | Facility: CLINIC | Age: 53
End: 2023-07-06
Payer: COMMERCIAL

## 2023-11-03 ENCOUNTER — TELEPHONE (OUTPATIENT)
Dept: ORTHOPEDICS | Facility: CLINIC | Age: 53
End: 2023-11-03
Payer: COMMERCIAL

## 2023-11-03 NOTE — TELEPHONE ENCOUNTER
Spoke with patient.  Carlos does not see hand pain/injury.  Gave number and names for  and Dr. Ireland.  Verbalized understanding.

## 2023-11-06 ENCOUNTER — HOSPITAL ENCOUNTER (OUTPATIENT)
Dept: RADIOLOGY | Facility: HOSPITAL | Age: 53
Discharge: HOME | End: 2023-11-06
Attending: ORTHOPAEDIC SURGERY
Payer: COMMERCIAL

## 2023-11-06 ENCOUNTER — OFFICE VISIT (OUTPATIENT)
Dept: ORTHOPEDICS | Facility: CLINIC | Age: 53
End: 2023-11-06
Payer: COMMERCIAL

## 2023-11-06 VITALS — BODY MASS INDEX: 30.7 KG/M2 | HEIGHT: 69 IN | WEIGHT: 207.25 LBS

## 2023-11-06 DIAGNOSIS — M54.16 LUMBAR RADICULOPATHY: Primary | ICD-10-CM

## 2023-11-06 DIAGNOSIS — M51.36 DDD (DEGENERATIVE DISC DISEASE), LUMBAR: ICD-10-CM

## 2023-11-06 DIAGNOSIS — M51.36 DDD (DEGENERATIVE DISC DISEASE), LUMBAR: Primary | ICD-10-CM

## 2023-11-06 PROCEDURE — 72110 X-RAY EXAM L-2 SPINE 4/>VWS: CPT | Mod: TC

## 2023-11-06 PROCEDURE — 99999 PR PBB SHADOW E&M-EST. PATIENT-LVL III: CPT | Mod: PBBFAC,,, | Performed by: ORTHOPAEDIC SURGERY

## 2023-11-06 PROCEDURE — 4010F PR ACE/ARB THEARPY RXD/TAKEN: ICD-10-PCS | Mod: CPTII,S$GLB,, | Performed by: ORTHOPAEDIC SURGERY

## 2023-11-06 PROCEDURE — 3044F HG A1C LEVEL LT 7.0%: CPT | Mod: CPTII,S$GLB,, | Performed by: ORTHOPAEDIC SURGERY

## 2023-11-06 PROCEDURE — 72110 XR LUMBAR SPINE AP AND LAT WITH FLEX/EXT: ICD-10-PCS | Mod: 26,,, | Performed by: RADIOLOGY

## 2023-11-06 PROCEDURE — 3044F PR MOST RECENT HEMOGLOBIN A1C LEVEL <7.0%: ICD-10-PCS | Mod: CPTII,S$GLB,, | Performed by: ORTHOPAEDIC SURGERY

## 2023-11-06 PROCEDURE — 3008F BODY MASS INDEX DOCD: CPT | Mod: CPTII,S$GLB,, | Performed by: ORTHOPAEDIC SURGERY

## 2023-11-06 PROCEDURE — 4010F ACE/ARB THERAPY RXD/TAKEN: CPT | Mod: CPTII,S$GLB,, | Performed by: ORTHOPAEDIC SURGERY

## 2023-11-06 PROCEDURE — 72110 X-RAY EXAM L-2 SPINE 4/>VWS: CPT | Mod: 26,,, | Performed by: RADIOLOGY

## 2023-11-06 PROCEDURE — 1159F MED LIST DOCD IN RCRD: CPT | Mod: CPTII,S$GLB,, | Performed by: ORTHOPAEDIC SURGERY

## 2023-11-06 PROCEDURE — 3008F PR BODY MASS INDEX (BMI) DOCUMENTED: ICD-10-PCS | Mod: CPTII,S$GLB,, | Performed by: ORTHOPAEDIC SURGERY

## 2023-11-06 PROCEDURE — 99214 OFFICE O/P EST MOD 30 MIN: CPT | Mod: S$GLB,,, | Performed by: ORTHOPAEDIC SURGERY

## 2023-11-06 PROCEDURE — 99214 PR OFFICE/OUTPT VISIT, EST, LEVL IV, 30-39 MIN: ICD-10-PCS | Mod: S$GLB,,, | Performed by: ORTHOPAEDIC SURGERY

## 2023-11-06 PROCEDURE — 1159F PR MEDICATION LIST DOCUMENTED IN MEDICAL RECORD: ICD-10-PCS | Mod: CPTII,S$GLB,, | Performed by: ORTHOPAEDIC SURGERY

## 2023-11-06 PROCEDURE — 99999 PR PBB SHADOW E&M-EST. PATIENT-LVL III: ICD-10-PCS | Mod: PBBFAC,,, | Performed by: ORTHOPAEDIC SURGERY

## 2023-11-06 RX ORDER — MELOXICAM 15 MG/1
15 TABLET ORAL DAILY
Qty: 30 TABLET | Refills: 0 | Status: SHIPPED | OUTPATIENT
Start: 2023-11-06

## 2023-11-06 RX ORDER — METHYLPREDNISOLONE 4 MG/1
TABLET ORAL
Qty: 1 EACH | Refills: 0 | Status: SHIPPED | OUTPATIENT
Start: 2023-11-06 | End: 2023-11-27

## 2023-11-06 RX ORDER — METHOCARBAMOL 750 MG/1
750 TABLET, FILM COATED ORAL NIGHTLY PRN
Qty: 30 TABLET | Refills: 0 | Status: SHIPPED | OUTPATIENT
Start: 2023-11-06 | End: 2023-12-06

## 2023-11-06 NOTE — PROGRESS NOTES
DATE: 11/6/2023  PATIENT: Joshua Luna    Supervising Physician: Maikol Drake M.D.    CHIEF COMPLAINT: low back and right leg pain    HISTORY:  Joshua Luna is a 53 y.o. male here for initial evaluation of low back and right leg pain (Back - 6). The pain has been present for a week without injury. The patient describes the pain as aching and sharp on the right side of his lower back with some radiation down the back of the right leg.  The pain is worse with activity and improved by rest. There is positive associated numbness and tingling. There is negative subjective weakness. Prior treatments have included no PT, ESIs, surgery. Pt says at age 14 he was told there was a defect in his lower back but denies rx.    The patient denies myelopathic symptoms such as handwriting changes or difficulty with buttons/coins/keys. Denies perineal paresthesias, bowel/bladder dysfunction.    PAST MEDICAL/SURGICAL HISTORY:  Past Medical History:   Diagnosis Date    Arthritis     Dry eyes     Dry mouth     Hypertension     Personal history of colonic polyps 12/17/2021     Past Surgical History:   Procedure Laterality Date    BONE GRAFT  06/2021    COLONOSCOPY N/A 12/17/2021    Procedure: COLONOSCOPY;  Surgeon: Yumiko Donald MD;  Location: [facility];  Service: Endoscopy;  Laterality: N/A;    ELBOW SURGERY  2006    right surgery    KNEE SURGERY  2009    6    ROTATOR CUFF REPAIR  [id number]    right shoulder    VASECTOMY  2003       Current Medications:   Current Outpatient Medications:     albuterol (PROVENTIL HFA) 90 mcg/actuation inhaler, Inhale 2 puffs into the lungs every 6 (six) hours as needed for Wheezing or Shortness of Breath. Rescue, Disp: 6.7 g, Rfl: 0    diclofenac sodium (VOLTAREN) 1 % Gel, Apply 2 g topically 3 (three) times daily., Disp: 100 g, Rfl: 2    ipratropium (ATROVENT) 21 mcg (0.03 %) nasal spray, Use 2 sprays by Each Nostril route 3 (three) times daily., Disp: 90 mL, Rfl: 3    meloxicam (MOBIC) 15 MG tablet,  "Take 1 tablet (15 mg total) by mouth once daily., Disp: 30 tablet, Rfl: 0    methocarbamoL (ROBAXIN) 750 MG Tab, Take 1 tablet (750 mg total) by mouth nightly as needed., Disp: 30 tablet, Rfl: 0    methylPREDNISolone (MEDROL DOSEPACK) 4 mg tablet, use as directed, Disp: 1 each, Rfl: 0    omeprazole (PRILOSEC) 40 MG capsule, Take 1 capsule (40 mg total) by mouth once daily. As needed., Disp: 90 capsule, Rfl: 3    valsartan (DIOVAN) 80 MG tablet, Take 1 tablet (80 mg total) by mouth once daily., Disp: 90 tablet, Rfl: 3  No current facility-administered medications for this visit.    Facility-Administered Medications Ordered in Other Visits:     triamcinolone acetonide injection 40 mg, 40 mg, Intra-articular, , Bob Casas IV, MD, 40 mg at 02/22/23 1045    Social History:   Social History     Socioeconomic History    Marital status:    Tobacco Use    Smoking status: Never    Smokeless tobacco: Never   Substance and Sexual Activity    Alcohol use: Yes     Comment: occasional    Drug use: No       REVIEW OF SYSTEMS:  Constitution: Negative. Negative for chills, fever and night sweats.   Cardiovascular: Negative for chest pain and syncope.   Respiratory: Negative for cough and shortness of breath.   Gastrointestinal: See HPI. Negative for nausea/vomiting. Negative for abdominal pain.  Genitourinary: See HPI. Negative for discoloration or dysuria.  Skin: Negative for dry skin, itching and rash.   Hematologic/Lymphatic: Negative for bleeding problem. Does not bruise/bleed easily.   Musculoskeletal: Negative for falls and muscle weakness.   Neurological: See HPI. No seizures.   Endocrine: Negative for polydipsia, polyphagia and polyuria.   Allergic/Immunologic: Negative for hives and persistent infections.    PHYSICAL EXAMINATION:    Ht 5' 9" (1.753 m)   Wt 94 kg (207 lb 3.7 oz)   BMI 30.60 kg/m²     General: The patient is a very pleasant 53 y.o. male in no apparent distress, the patient is oriented to person, " place and time.   Psych: Normal mood and affect  HEENT: Vision grossly intact, hearing intact to the spoken word.  Lungs: Respirations unlabored.  Gait: Normal station and gait, no difficulty with toe or heel walk.   Skin: Dorsal lumbar skin negative for rashes, lesions, hairy patches and surgical scars.  Range of motion: Lumbar range of motion is acceptable. There is mild lumbar tenderness to palpation.  Spinal Balance: Global saggital and coronal spinal balance acceptable, no significant for scoliosis and kyphosis.  Musculoskeletal: No pain with the range of motion of the bilateral hips. No trochanteric tenderness to palpation.  Vascular: Bilateral lower extremities warm and well perfused, Dorsalis pedis pulses 2+ bilaterally.  Neurological: Normal strength and tone in all major motor groups in the bilateral lower extremities. Normal sensation to light touch in the L2-S1 dermatomes bilaterally.  Deep tendon reflexes symmetric 2+ in the bilateral lower extremities.  Negative Babinski bilaterally.  Straight leg raise negative bilaterally.     IMAGING:   Today I personally reviewed AP, Lat and Flex/Ex upright L-spine films that demonstrate grade 1 L5/S1 anterolisthesis with probably an associated spondylolysis..  The lumbosacral disc space is narrowed.      Body mass index is 30.6 kg/m².    Hemoglobin A1C   Date Value Ref Range Status   01/27/2023 5.6 4.0 - 5.6 % Final     Comment:     ADA Screening Guidelines:  5.7-6.4%  Consistent with prediabetes  >or=6.5%  Consistent with diabetes    High levels of fetal hemoglobin interfere with the HbA1C  assay. Heterozygous hemoglobin variants (HbS, HgC, etc)do  not significantly interfere with this assay.   However, presence of multiple variants may affect accuracy.           ASSESSMENT/PLAN:    There are no diagnoses linked to this encounter.    Today we discussed at length all of the different treatment options including anti-inflammatories, acetaminophen, rest, ice, heat,  physical therapy including strengthening and stretching exercises, home exercises, ROM, aerobic conditioning, aqua therapy, other modalities including ultrasound, massage, and dry needling, epidural steroid injections and finally surgical intervention.      Pt presents with acute low back pain and radiculopathy. Will send medrol dose pack, mobic, robaxin to pharmacy. I provided the patient with a home exercise program. It is the [employer] spine conditioning program. Exercises include head rolls, kneeling back extension, sitting rotation stretch, modified seated side straddle, knee to chest, bird dog, plank, modified seated plank, hip bridges, abdominal bracing, and abdominal crunch. Pt will complete each exercise 5 times daily for 6-8 weeks. Pt will fu if pain persists.

## 2023-11-24 ENCOUNTER — PATIENT MESSAGE (OUTPATIENT)
Dept: ORTHOPEDICS | Facility: CLINIC | Age: 53
End: 2023-11-24
Payer: COMMERCIAL

## 2024-01-19 ENCOUNTER — OFFICE VISIT (OUTPATIENT)
Dept: FAMILY MEDICINE | Facility: CLINIC | Age: 54
End: 2024-01-19
Payer: COMMERCIAL

## 2024-01-19 VITALS
DIASTOLIC BLOOD PRESSURE: 78 MMHG | OXYGEN SATURATION: 98 % | WEIGHT: 209.56 LBS | TEMPERATURE: 98 F | HEIGHT: 69 IN | HEART RATE: 84 BPM | SYSTOLIC BLOOD PRESSURE: 122 MMHG | BODY MASS INDEX: 31.04 KG/M2

## 2024-01-19 DIAGNOSIS — R73.9 HYPERGLYCEMIA: ICD-10-CM

## 2024-01-19 DIAGNOSIS — Z23 FLU VACCINE NEED: ICD-10-CM

## 2024-01-19 DIAGNOSIS — Z00.00 ROUTINE HEALTH MAINTENANCE: Primary | ICD-10-CM

## 2024-01-19 PROCEDURE — 3078F DIAST BP <80 MM HG: CPT | Mod: CPTII,S$GLB,, | Performed by: FAMILY MEDICINE

## 2024-01-19 PROCEDURE — 3044F HG A1C LEVEL LT 7.0%: CPT | Mod: CPTII,S$GLB,, | Performed by: FAMILY MEDICINE

## 2024-01-19 PROCEDURE — 3074F SYST BP LT 130 MM HG: CPT | Mod: CPTII,S$GLB,, | Performed by: FAMILY MEDICINE

## 2024-01-19 PROCEDURE — 99396 PREV VISIT EST AGE 40-64: CPT | Mod: 25,S$GLB,, | Performed by: FAMILY MEDICINE

## 2024-01-19 PROCEDURE — 4010F ACE/ARB THERAPY RXD/TAKEN: CPT | Mod: CPTII,S$GLB,, | Performed by: FAMILY MEDICINE

## 2024-01-19 PROCEDURE — 90471 IMMUNIZATION ADMIN: CPT | Mod: S$GLB,,, | Performed by: FAMILY MEDICINE

## 2024-01-19 PROCEDURE — 90686 IIV4 VACC NO PRSV 0.5 ML IM: CPT | Mod: S$GLB,,, | Performed by: FAMILY MEDICINE

## 2024-01-19 PROCEDURE — 1160F RVW MEDS BY RX/DR IN RCRD: CPT | Mod: CPTII,S$GLB,, | Performed by: FAMILY MEDICINE

## 2024-01-19 PROCEDURE — 3008F BODY MASS INDEX DOCD: CPT | Mod: CPTII,S$GLB,, | Performed by: FAMILY MEDICINE

## 2024-01-19 PROCEDURE — 1159F MED LIST DOCD IN RCRD: CPT | Mod: CPTII,S$GLB,, | Performed by: FAMILY MEDICINE

## 2024-01-19 RX ORDER — VALSARTAN 80 MG/1
80 TABLET ORAL DAILY
Qty: 90 TABLET | Refills: 3 | Status: SHIPPED | OUTPATIENT
Start: 2024-01-19

## 2024-01-19 NOTE — PROGRESS NOTES
" Patient ID: Joshua Luna is a 53 y.o. male.    Chief Complaint: Annual Exam    HPI       Joshua Luna is a 53 y.o. male here for annual examination not having any problems at this time.  Followed for muscle skeletal pain including knee pain.  Follow with Orthopedics.      Review of Symptoms        Physical Exam    Vitals:    01/19/24 0816   BP: 122/78   BP Location: Left arm   Patient Position: Sitting   Pulse: 84   Temp: 97.6 °F (36.4 °C)   TempSrc: Oral   SpO2: 98%   Weight: 95.1 kg (209 lb 8.8 oz)   Height: 5' 9" (1.753 m)        Constitutional:   Oriented to person, place, and time.appears well-developed and well-nourished.   No distress.     HENT:   Head: Normocephalic and atraumatic.     Right Ear: Tympanic membrane, external ear and ear canal normal     Left Ear: Tympanic membrane, external ear and ear canal normal    Nose: External Normal. Normal turbinates, No rhinorrhea     Mouth/Throat: Uvula is midline, oropharynx is clear and moist and mucous membranes are normal.     Neck: supple no anterior cervical adenopathy    Carotid artery:  Bruit    NEG []   POS[]    Eyes:   Conjunctivae are normal. Right eye exhibits no discharge. Left eye exhibits no discharge. No scleral icterus. No periorbital edema     Cardiovascular:  Regular rate and rhythm with normal S1 and S2     Pulmonary/Chest:   Clear to auscultation bilaterally without wheezes, rhonchi or rales    Musculoskeletal:  No edema. No obvious deformity No wasting     Neurological:   Alert and oriented to person, place, and time. Coordination normal.     Skin:   Skin is warm and dry.   No diaphoresis.   No rash noted.    Psychiatric: Normal mood and affect. Behavior is normal. Judgment and thought content normal.       Assessment / Plan:      ICD-10-CM ICD-9-CM   1. Routine health maintenance  Z00.00 V70.0   2. Flu vaccine need  Z23 V04.81   3. Hyperglycemia  R73.9 790.29     Routine health maintenance  -     Hemoglobin A1C; Future; Expected date: " 01/19/2024    Flu vaccine need  -     Influenza - Quadrivalent *Preferred* (6 months+) (PF)    Hyperglycemia  -     Hemoglobin A1C; Future; Expected date: 01/19/2024    Other orders  -     valsartan (DIOVAN) 80 MG tablet; Take 1 tablet (80 mg total) by mouth once daily.  Dispense: 90 tablet; Refill: 3    Patient with elevated blood pressure reading-Diovan 80 milligrams daily   Dyspepsia Prilosec p.r.nSouleymane  Mobic-take intermittently  Discussed lifestyle-in order healthy

## 2024-02-16 RX ORDER — IPRATROPIUM BROMIDE 21 UG/1
2 SPRAY, METERED NASAL 3 TIMES DAILY
Qty: 90 ML | Refills: 3 | Status: SHIPPED | OUTPATIENT
Start: 2024-02-16

## 2024-02-16 NOTE — TELEPHONE ENCOUNTER
Care Due:                  Date            Visit Type   Department     Provider  --------------------------------------------------------------------------------                                EP -                              PRIMARY      [facility] FAMILY  Last Visit: 01-      CARE ([facility])   MEDICINE       Rico Trotter                              EP -                              PRIMARY      [facility] FAMILY  Next Visit: 01-      CARE ([facility])   MEDICINE       Rico Trotter                                                            Last  Test          Frequency    Reason                     Performed    Due Date  --------------------------------------------------------------------------------    CMP.........  12 months..  valsartan................  01- 05-    [facility] Embedded Care Due Messages. Reference number: [id number].   2/16/2024 12:36:21 PM CST

## 2024-02-17 NOTE — TELEPHONE ENCOUNTER
Provider Staff:  Action required for this patient    Requires labs      Please see care gap opportunities below in Care Due Message.    Thanks!  [facility]     Appointments      Date Provider   Last Visit   1/19/2024 Rico Trotter MD   Next Visit   Visit date not found Rico Trotter MD     Refill Decision Note   Joshua Luna  is requesting a refill authorization.  Brief Assessment and Rationale for Refill:  Approve     Medication Therapy Plan:         Comments:     Note composed:8:02 PM 02/16/2024

## 2024-02-27 ENCOUNTER — CLINICAL SUPPORT (OUTPATIENT)
Dept: OTHER | Facility: CLINIC | Age: 54
End: 2024-02-27
Payer: COMMERCIAL

## 2024-02-27 DIAGNOSIS — Z00.8 ENCOUNTER FOR OTHER GENERAL EXAMINATION: ICD-10-CM

## 2024-02-27 PROCEDURE — 82947 ASSAY GLUCOSE BLOOD QUANT: CPT | Mod: QW,S$GLB,, | Performed by: INTERNAL MEDICINE

## 2024-02-27 PROCEDURE — 80061 LIPID PANEL: CPT | Mod: QW,S$GLB,, | Performed by: INTERNAL MEDICINE

## 2024-02-27 PROCEDURE — 99401 PREV MED CNSL INDIV APPRX 15: CPT | Mod: S$GLB,,, | Performed by: INTERNAL MEDICINE

## 2024-02-28 VITALS
BODY MASS INDEX: 29.77 KG/M2 | SYSTOLIC BLOOD PRESSURE: 155 MMHG | WEIGHT: 201 LBS | HEIGHT: 69 IN | DIASTOLIC BLOOD PRESSURE: 90 MMHG

## 2024-02-28 LAB
GLUCOSE SERPL-MCNC: 106 MG/DL (ref 60–140)
HDLC SERPL-MCNC: 54 MG/DL
POC CHOLESTEROL, LDL (DOCK): 83 MG/DL
POC CHOLESTEROL, TOTAL: 161 MG/DL
TRIGL SERPL-MCNC: 137 MG/DL

## 2024-05-07 ENCOUNTER — OFFICE VISIT (OUTPATIENT)
Dept: FAMILY MEDICINE | Facility: CLINIC | Age: 54
End: 2024-05-07
Payer: COMMERCIAL

## 2024-05-07 VITALS
WEIGHT: 209.75 LBS | OXYGEN SATURATION: 97 % | HEIGHT: 69 IN | DIASTOLIC BLOOD PRESSURE: 64 MMHG | HEART RATE: 93 BPM | BODY MASS INDEX: 31.07 KG/M2 | SYSTOLIC BLOOD PRESSURE: 126 MMHG | TEMPERATURE: 98 F

## 2024-05-07 DIAGNOSIS — Z00.00 ROUTINE HEALTH MAINTENANCE: Primary | ICD-10-CM

## 2024-05-07 DIAGNOSIS — M54.50 LUMBAR PAIN: ICD-10-CM

## 2024-05-07 DIAGNOSIS — M54.9 DORSALGIA, UNSPECIFIED: ICD-10-CM

## 2024-05-07 PROCEDURE — 3078F DIAST BP <80 MM HG: CPT | Mod: CPTII,S$GLB,, | Performed by: FAMILY MEDICINE

## 2024-05-07 PROCEDURE — 3044F HG A1C LEVEL LT 7.0%: CPT | Mod: CPTII,S$GLB,, | Performed by: FAMILY MEDICINE

## 2024-05-07 PROCEDURE — 4010F ACE/ARB THERAPY RXD/TAKEN: CPT | Mod: CPTII,S$GLB,, | Performed by: FAMILY MEDICINE

## 2024-05-07 PROCEDURE — 3008F BODY MASS INDEX DOCD: CPT | Mod: CPTII,S$GLB,, | Performed by: FAMILY MEDICINE

## 2024-05-07 PROCEDURE — 1159F MED LIST DOCD IN RCRD: CPT | Mod: CPTII,S$GLB,, | Performed by: FAMILY MEDICINE

## 2024-05-07 PROCEDURE — 1160F RVW MEDS BY RX/DR IN RCRD: CPT | Mod: CPTII,S$GLB,, | Performed by: FAMILY MEDICINE

## 2024-05-07 PROCEDURE — 3074F SYST BP LT 130 MM HG: CPT | Mod: CPTII,S$GLB,, | Performed by: FAMILY MEDICINE

## 2024-05-07 PROCEDURE — 99396 PREV VISIT EST AGE 40-64: CPT | Mod: S$GLB,,, | Performed by: FAMILY MEDICINE

## 2024-05-07 RX ORDER — PREDNISONE 10 MG/1
TABLET ORAL
Qty: 18 TABLET | Refills: 0 | Status: SHIPPED | OUTPATIENT
Start: 2024-05-07

## 2024-05-15 ENCOUNTER — PATIENT MESSAGE (OUTPATIENT)
Dept: ORTHOPEDICS | Facility: CLINIC | Age: 54
End: 2024-05-15
Payer: COMMERCIAL

## 2024-05-16 DIAGNOSIS — M25.522 LEFT ELBOW PAIN: Primary | ICD-10-CM

## 2024-05-18 NOTE — PROGRESS NOTES
" Patient ID: Joshua Luna is a 53 y.o. male.    Chief Complaint: Back Pain    HPI     Joshua Luna is a 53 y.o. male. here for annual exam.  Complaining of lower back pain that often radiates inferiorly.  No red flag symptoms on neuropathy associated with this.  Position does not make a difference.  Can off loaded and relieve some of his pain.  Using over-the-counter medication.    Review of Symptoms    Constitutional: Negative.    HENT: Negative.    Eyes: Negative.    Respiratory: Negative.    Cardiovascular: Negative.    Gastrointestinal: Negative.    Endocrine: Negative.    Genitourinary: Negative.    Musculoskeletal: Negative.    Skin: Negative.    Allergic/Immunologic: Negative.    Neurological: Negative.    Hematological: Negative.    Psychiatric/Behavioral: Negative.      Except as above in HPI    Current Outpatient Medications on File Prior to Visit   Medication Sig Dispense Refill    diclofenac sodium (VOLTAREN) 1 % Gel Apply 2 g topically 3 (three) times daily. 100 g 2    ipratropium (ATROVENT) 21 mcg (0.03 %) nasal spray Use 2 sprays by Each Nostril route 3 (three) times daily. 90 mL 3    omeprazole (PRILOSEC) 40 MG capsule Take 1 capsule (40 mg total) by mouth once daily. As needed. 90 capsule 3    valsartan (DIOVAN) 80 MG tablet Take 1 tablet (80 mg total) by mouth once daily. 90 tablet 3     No current facility-administered medications on file prior to visit.         Physical  Exam    Vitals:    05/07/24 0809   BP: 126/64   BP Location: Left arm   Patient Position: Sitting   Pulse: 93   Temp: 97.6 °F (36.4 °C)   TempSrc: Oral   SpO2: 97%   Weight: 95.1 kg (209 lb 12.3 oz)   Height: 5' 9" (1.753 m)          Constitutional:  Oriented to person, place, and time. Appears well-developed and well-nourished.     HENT:   Head: Normocephalic and atraumatic.     Right Ear: External ear normal     Left Ear: External ear normal      Nose: Nose normal. No rhinorrhea or nasal deformity.     Mouth/Throat: Moist mucus " membranes      Eyes: Conjunctivae are normal. Right eye exhibits no discharge. Left eye exhibits no  discharge. No scleral icterus.     Neck:  No JVD present. No tracheal deviation  []  Neck supple.   Carotid Arteries  []  No Bruit    Cardiovascular:  Regular rate and rhythm with normal S1 and S2     Pulmonary/Chest:   Clear to auscultation bilaterally without wheezes, rhonchi or rales    Abdominal: Soft. No distension and no mass.  No tenderness. No rebound and No guarding.     Musculoskeletal: Normal range of motion. No edema or tenderness.   No deformity   Gait is normal.  Strength of upper and lower leg muscle groups normal   sensation is normal.  Able to flex forward about hip over 90 degrees and able to extend spine backwards with no pain.    Lymphadenopathy:   []  No cervical adenopathy.  []  No inguinal adenopathy    Neurological:  Alert and oriented to person, place, and time. Coordination normal.     Skin: Skin is warm and dry. No rash noted. No bruising     Psychiatric: Normal mood and affect. Speech is normal and behavior is normal. Judgment and thought content normal.       Assessment / Plan:      ICD-10-CM ICD-9-CM   1. Routine health maintenance  Z00.00 V70.0   2. Lumbar pain  M54.50 724.2   3. Dorsalgia, unspecified  M54.9 724.5     Routine health maintenance    Lumbar pain  -     Ambulatory referral/consult to Pain Clinic; Future; Expected date: 05/14/2024    Dorsalgia, unspecified  -     MRI Lumbar Spine Without Contrast; Future; Expected date: 05/07/2024    Other orders  -     predniSONE (DELTASONE) 10 MG tablet; 3 tablets by mouth for 3 days then 2 tablets by mouth for 3 days then 1 tablet by mouth for the 3 days.  Dispense: 18 tablet; Refill: 0          Discussed how to stay healthy             Rico Eng M.D.     Wound Care: Petrolatum

## 2024-05-21 ENCOUNTER — OFFICE VISIT (OUTPATIENT)
Dept: PAIN MEDICINE | Facility: CLINIC | Age: 54
End: 2024-05-21
Payer: COMMERCIAL

## 2024-05-21 VITALS
HEIGHT: 69 IN | BODY MASS INDEX: 31.06 KG/M2 | WEIGHT: 209.69 LBS | SYSTOLIC BLOOD PRESSURE: 152 MMHG | DIASTOLIC BLOOD PRESSURE: 81 MMHG | HEART RATE: 88 BPM

## 2024-05-21 DIAGNOSIS — M54.50 LUMBAR PAIN: ICD-10-CM

## 2024-05-21 DIAGNOSIS — I10 HYPERTENSION, UNSPECIFIED TYPE: ICD-10-CM

## 2024-05-21 DIAGNOSIS — M51.36 DDD (DEGENERATIVE DISC DISEASE), LUMBAR: ICD-10-CM

## 2024-05-21 DIAGNOSIS — M53.3 SACROILIAC JOINT PAIN: Primary | ICD-10-CM

## 2024-05-21 PROCEDURE — 4010F ACE/ARB THERAPY RXD/TAKEN: CPT | Mod: CPTII,S$GLB,, | Performed by: STUDENT IN AN ORGANIZED HEALTH CARE EDUCATION/TRAINING PROGRAM

## 2024-05-21 PROCEDURE — 99999 PR PBB SHADOW E&M-EST. PATIENT-LVL IV: CPT | Mod: PBBFAC,,, | Performed by: STUDENT IN AN ORGANIZED HEALTH CARE EDUCATION/TRAINING PROGRAM

## 2024-05-21 PROCEDURE — 3008F BODY MASS INDEX DOCD: CPT | Mod: CPTII,S$GLB,, | Performed by: STUDENT IN AN ORGANIZED HEALTH CARE EDUCATION/TRAINING PROGRAM

## 2024-05-21 PROCEDURE — 3044F HG A1C LEVEL LT 7.0%: CPT | Mod: CPTII,S$GLB,, | Performed by: STUDENT IN AN ORGANIZED HEALTH CARE EDUCATION/TRAINING PROGRAM

## 2024-05-21 PROCEDURE — 1160F RVW MEDS BY RX/DR IN RCRD: CPT | Mod: CPTII,S$GLB,, | Performed by: STUDENT IN AN ORGANIZED HEALTH CARE EDUCATION/TRAINING PROGRAM

## 2024-05-21 PROCEDURE — 3079F DIAST BP 80-89 MM HG: CPT | Mod: CPTII,S$GLB,, | Performed by: STUDENT IN AN ORGANIZED HEALTH CARE EDUCATION/TRAINING PROGRAM

## 2024-05-21 PROCEDURE — 99204 OFFICE O/P NEW MOD 45 MIN: CPT | Mod: S$GLB,,, | Performed by: STUDENT IN AN ORGANIZED HEALTH CARE EDUCATION/TRAINING PROGRAM

## 2024-05-21 PROCEDURE — 3077F SYST BP >= 140 MM HG: CPT | Mod: CPTII,S$GLB,, | Performed by: STUDENT IN AN ORGANIZED HEALTH CARE EDUCATION/TRAINING PROGRAM

## 2024-05-21 PROCEDURE — 1159F MED LIST DOCD IN RCRD: CPT | Mod: CPTII,S$GLB,, | Performed by: STUDENT IN AN ORGANIZED HEALTH CARE EDUCATION/TRAINING PROGRAM

## 2024-05-21 NOTE — PROGRESS NOTES
Chronic Pain - New Consult    Referring Physician: Rico Trotter MD    Chief Complaint:   Chief Complaint   Patient presents with    Low-back Pain          SUBJECTIVE:    Joshua Luna presents to the clinic for the evaluation of lower back and left leg pain. The pain started years (when 14 years old) ago following no inciting event and symptoms have been worsening.The pain is located in the lower back area and radiates to the left leg (back of thigh).  The pain is described as sharp, shooting, and tingling and is rated as 6/10. The pain is rated with a score of  2/10 on the BEST day and a score of 8/10 on the WORST day.  Symptoms interfere with daily activity, sleeping, and work. The pain is exacerbated by Sitting and Getting out of bed/chair.  The pain is mitigated by standing/stretching. The patient reports 4-6 hours of uninterrupted sleep per night.    Patient denies urinary incontinence and bowel incontinence. He reports that the pain was severe in his 20s, but only recently started affecting him. He went to a chiropractor in the early 90s but it didn't help. He also had left knee surgery which also contributes to his pain and some left knee weakness (sometimes the knee sergio)    Physical Therapy/Home Exercise: yes, years ago, continues to do [facility] spine conditioning program as given from [facility] in November 2023      Pain Disability Index Review:      5/21/2024     8:38 AM   Last 3 PDI Scores   Pain Disability Index (PDI) 42       Pain Medications:   - ibuprofen/tylenol (helps a little)     report:  Reviewed and consistent with medication use as prescribed.    Pain Procedures:   None    Imaging:   MRI LUMBAR SPINE WITHOUT CONTRAST     CLINICAL HISTORY:  Low back pain, symptoms persist with > 6wks conservative treatment; Dorsalgia, unspecified     TECHNIQUE:  Multiplanar, multisequence MR images were acquired from the thoracolumbar junction to the sacrum without the administration of contrast.      FINDINGS:  Alignment: Grade 1 anterolisthesis at L5-S1 demonstrates reduction in the sub pontine MRI environment.     Vertebrae: 5 lumbar-type vertebral bodies. No aggressive marrow replacement process or fracture.Nobone marrow edema .     Discs: Satisfactory height and signal.     Cord: Normal. Conus terminates at L1-L2.     Degenerative findings:     T12-L1: There is no focal disc herniation. No significant central canal narrowing . No significant neural foraminal narrowing.     L1-L2: Broad-based subligamentous disc extrusion with effacement of the anterior thecal sac margins.  Facet hypertrophy with facet fluid lateralizing RIGHT..  Mild central canal narrowing . No significant neural foraminal narrowing.     L2-L3: There is no focal disc herniation.Mild facet hypertrophy.  No significant central canal narrowing . No significant neural foraminal narrowing.     L3-L4: There is no focal disc herniation. No significant central canal narrowing . No significant neural foraminal narrowing.     L4-L5: There is no focal disc herniation. No significant central canal narrowing . No significant neural foraminal narrowing.     L5-S1: Grade 1 anterolisthesis with spondylolysis.   There is no focal disc herniation. No significant central canal narrowing . Mild LEFT greater than RIGHT neural foraminal narrowing.     Paraspinal muscles & soft tissues: Unremarkable.     Impression:     Broad-based disc extrusion subligamentous L1-L2 with effacement of the anterior thecal sac margins, and mild facet hypertrophy results in mild central canal narrowing.     Grade 1 anterolisthesis best seen on radiograph of 01/06/2024 with associated spondylolysis        Electronically signed by:Don Barr MD  Date:                                            05/14/2024  Time:                                           08:59    XR LUMBAR SPINE AP AND LAT WITH FLEX/EXT     CLINICAL HISTORY:  Other intervertebral disc degeneration, lumbar  region     TECHNIQUE:  AP and lateral views as well as lateral flexion and extension images are performed through the lumbar spine.     COMPARISON:  None     FINDINGS:  There is a grade 1 L5/S1 anterolisthesis with probably an associated spondylolysis..  The lumbosacral disc space is narrowed.  The other disc spaces are well maintained.  Mild DJD.  No acute fracture or bone destruction identified     Impression:     See above        Electronically signed by:Rob Blake MD  Date:                                            11/06/2023  Time:                                           13:10    Past Medical History:   Diagnosis Date    Arthritis     Dry eyes     Dry mouth     Hypertension     Personal history of colonic polyps 12/17/2021     Past Surgical History:   Procedure Laterality Date    BONE GRAFT  06/2021    COLONOSCOPY N/A 12/17/2021    Procedure: COLONOSCOPY;  Surgeon: Yumiko Donald MD;  Location: [facility];  Service: Endoscopy;  Laterality: N/A;    ELBOW SURGERY  2006    right surgery    KNEE SURGERY  2009    5    ROTATOR CUFF REPAIR  [phone]    right shoulder    VASECTOMY  2003     Social History     Socioeconomic History    Marital status:    Tobacco Use    Smoking status: Never     Passive exposure: Never    Smokeless tobacco: Never   Substance and Sexual Activity    Alcohol use: Yes     Comment: occasional    Drug use: No     Family History   Problem Relation Name Age of Onset    Cancer Mother          Breast & colon    Arthritis Mother      Bell's palsy Mother      Rheum arthritis Mother      Diabetes Father      Arthritis Father      Pulmonary fibrosis Father      Hypertension Father      Arthritis Sister      Asthma Daughter      Eczema Daughter      Asthma Son      Narcolepsy Sister      No Known Problems Sister      No Known Problems Sister      No Known Problems Sister         Review of patient's allergies indicates:  No Known Allergies    Current Outpatient Medications   Medication  "Sig    diclofenac sodium (VOLTAREN) 1 % Gel Apply 2 g topically 3 (three) times daily.    ipratropium (ATROVENT) 21 mcg (0.03 %) nasal spray Use 2 sprays by Each Nostril route 3 (three) times daily.    omeprazole (PRILOSEC) 40 MG capsule Take 1 capsule (40 mg total) by mouth once daily. As needed.    predniSONE (DELTASONE) 10 MG tablet 3 tablets by mouth for 3 days then 2 tablets by mouth for 3 days then 1 tablet by mouth for the 3 days.    valsartan (DIOVAN) 80 MG tablet Take 1 tablet (80 mg total) by mouth once daily.     No current facility-administered medications for this visit.       REVIEW OF SYSTEMS:    GENERAL:  current fevers or chills, recent use of antibiotics denies.  HEENT:  History of migraines/headaches: denies, History of major throat surgery: denies  RESPIRATORY:  History of home oxygen or pulmonary hypertension/severe breathing dysfunction: denies  CARDIOVASCULAR:  Hx of palpitations/rhythm problems: denies Hx of Heart Attacks/Surgery: denies  GI:  Recent abdominal discomfort or recent change in bowel habits denies  MUSCULOSKELETAL:  See HPI.  SKIN:  unhealed wounds or rashes: denies  PSYCH: major psychiatric history or recent psychosocial stressors denies  HEMATOLOGY/LYMPHOLOGY:  Hx of prolonged bleeding, Hx of Blood thinner usage: denies  NEURO:   history of seizures, strokes, chronic/old weakness (such as paralysis or paresis of any body part): denies  All other reviewed and negative other than HPI.    OBJECTIVE:    BP (!) 152/81 (BP Location: Left arm, Patient Position: Sitting)   Pulse 88   Ht 5' 9" (1.753 m)   Wt 95.1 kg (209 lb 10.5 oz)   BMI 30.96 kg/m²     PHYSICAL EXAMINATION:  General appearance: Well appearing, in no acute distress, alert and appropriately communicative.  Psych:  Mood and affect appropriate.  Skin: Skin color, texture, turgor normal, no rashes or lesions, in both upper and lower body for exposed skin.  Head/face:  Atraumatic, normocephalic.  Cor: regular " rate  Pulm: non-labored breathing  GI: Abdomen non-distended and non-tender.  Back: Straight leg raising in the sitting and supine positions is negative to radicular pain.  pain to palpation over the spine and/or paraspinal muscles. Pain with lumbar extension and facet loading.  Extremities: No deformities, significant lymphedema, or skin discoloration. No significant open wounds. No major amputations.  Musculoskeletal:   hip, sacroiliac and knee provocative maneuvers are negative with the exception of - SI Tenderness left +, SI Distraction left +, ANA MARIA left +, Gaenslen left +, Yeoman's left +, Pelvic Compression left +, Clara Finger left +, SI thrust left +, Thigh thrust left +. Bilateral upper and lower extremity strength is normal and symmetric.  No atrophy or tone abnormalities are noted.  Neuro: Bilateral upper and lower extremity coordination and muscle stretch reflexes abnormalities noted: none.  Kirk and/or Clonus: negative; loss of sensation to light touch: none  Gait: none    CMP  Sodium   Date Value Ref Range Status   05/15/2023 137 136 - 145 mmol/L Final     Potassium   Date Value Ref Range Status   05/15/2023 4.2 3.5 - 5.1 mmol/L Final     Chloride   Date Value Ref Range Status   05/15/2023 104 95 - 110 mmol/L Final     CO2   Date Value Ref Range Status   05/15/2023 25 23 - 29 mmol/L Final     Glucose   Date Value Ref Range Status   05/15/2023 112 (H) 70 - 110 mg/dL Final     BUN   Date Value Ref Range Status   05/15/2023 11 2 - 20 mg/dL Final     Creatinine   Date Value Ref Range Status   05/15/2023 0.85 0.50 - 1.40 mg/dL Final     Calcium   Date Value Ref Range Status   05/15/2023 9.1 8.7 - 10.5 mg/dL Final     Total Protein   Date Value Ref Range Status   01/27/2023 7.4 6.0 - 8.4 g/dL Final     Albumin   Date Value Ref Range Status   01/27/2023 4.8 3.5 - 5.2 g/dL Final     Total Bilirubin   Date Value Ref Range Status   01/27/2023 0.5 0.1 - 1.0 mg/dL Final     Comment:     For infants and  newborns, interpretation of results should be based  on gestational age, weight and in agreement with clinical  observations.    Premature Infant recommended reference ranges:  Up to 24 hours.............<8.0 mg/dL  Up to 48 hours............<12.0 mg/dL  3-5 days..................<15.0 mg/dL  6-29 days.................<15.0 mg/dL       Alkaline Phosphatase   Date Value Ref Range Status   01/27/2023 66 38 - 126 U/L Final     AST   Date Value Ref Range Status   01/27/2023 42 15 - 46 U/L Final     ALT   Date Value Ref Range Status   01/27/2023 58 (H) 10 - 44 U/L Final     Anion Gap   Date Value Ref Range Status   05/15/2023 8 8 - 16 mmol/L Final     eGFR   Date Value Ref Range Status   05/15/2023 >60.0 >60 mL/min/1.73 m^2 Final     ASSESSMENT: 53 y.o. year old male with lower back pain, consistent with:    1. Sacroiliac joint pain  Ambulatory referral/consult to Physical/Occupational Therapy      2. Lumbar pain  Ambulatory referral/consult to Pain Clinic    Ambulatory referral/consult to Physical/Occupational Therapy      3. DDD (degenerative disc disease), lumbar  Ambulatory referral/consult to Physical/Occupational Therapy        IMPRESSION: Joshua Luna presents today for lower back pain and left hip pain.  History and physical exam are consistent with left sacroiliitis and left lumbar radiculopathy.  My independent interpretation of the imaging is consistent with lumbar degenerative disc disease with central canal narrowing at L1/2 and L5/S1.  We will start with basic imaging and conservative management (physical therapy and non-narcotic medications). If their pain persists despite conservative measures, we will consider interventions in an effort to give them additional relief for their pain.    PLAN:   - I have stressed the importance of physical activity and a home exercise plan to help with pain and improve health.  - Patient can continue with medications for now since they are providing benefits, using them  appropriately, and without side effects.  - he can continue ibuprofen and tylenol as needed for his pain, as he finds these somewhat effective.  - referral to physical therapy for lower back pain and left sacroiliitis  - he will decide if he wants to pursue a left SI joint injection.  He can send a message when he wants to get this scheduled.  - RTC in 2 - 3 months (or earlier if/when he decides to pursue a left SI joint injection  - Counseled patient regarding the importance of activity modification and physical therapy.    The above plan and management options were discussed at length with patient. Patient is in agreement with the above and verbalized understanding. It will be communicated with the referring physician via electronic record, fax, or mail.    Martín Rivas  05/21/2024

## 2024-05-22 ENCOUNTER — HOSPITAL ENCOUNTER (OUTPATIENT)
Dept: RADIOLOGY | Facility: HOSPITAL | Age: 54
Discharge: HOME | End: 2024-05-22
Attending: ORTHOPAEDIC SURGERY
Payer: COMMERCIAL

## 2024-05-22 ENCOUNTER — OFFICE VISIT (OUTPATIENT)
Dept: ORTHOPEDICS | Facility: CLINIC | Age: 54
End: 2024-05-22
Payer: COMMERCIAL

## 2024-05-22 VITALS — BODY MASS INDEX: 31.06 KG/M2 | HEIGHT: 69 IN | WEIGHT: 209.69 LBS

## 2024-05-22 DIAGNOSIS — M77.02 MEDIAL EPICONDYLITIS OF LEFT ELBOW: ICD-10-CM

## 2024-05-22 DIAGNOSIS — M25.522 LEFT ELBOW PAIN: ICD-10-CM

## 2024-05-22 DIAGNOSIS — S56.212A STRAIN OF OTHER FLEXOR MUSCLE, FASCIA AND TENDON AT FOREARM LEVEL, LEFT ARM, INITIAL ENCOUNTER: ICD-10-CM

## 2024-05-22 PROCEDURE — 99214 OFFICE O/P EST MOD 30 MIN: CPT | Mod: S$GLB,,, | Performed by: ORTHOPAEDIC SURGERY

## 2024-05-22 PROCEDURE — 3044F HG A1C LEVEL LT 7.0%: CPT | Mod: CPTII,S$GLB,, | Performed by: ORTHOPAEDIC SURGERY

## 2024-05-22 PROCEDURE — 3008F BODY MASS INDEX DOCD: CPT | Mod: CPTII,S$GLB,, | Performed by: ORTHOPAEDIC SURGERY

## 2024-05-22 PROCEDURE — 1159F MED LIST DOCD IN RCRD: CPT | Mod: CPTII,S$GLB,, | Performed by: ORTHOPAEDIC SURGERY

## 2024-05-22 PROCEDURE — 73080 X-RAY EXAM OF ELBOW: CPT | Mod: 26,LT,, | Performed by: STUDENT IN AN ORGANIZED HEALTH CARE EDUCATION/TRAINING PROGRAM

## 2024-05-22 PROCEDURE — 73080 X-RAY EXAM OF ELBOW: CPT | Mod: TC,PN,LT

## 2024-05-22 PROCEDURE — 4010F ACE/ARB THERAPY RXD/TAKEN: CPT | Mod: CPTII,S$GLB,, | Performed by: ORTHOPAEDIC SURGERY

## 2024-05-22 PROCEDURE — 99999 PR PBB SHADOW E&M-EST. PATIENT-LVL III: CPT | Mod: PBBFAC,,, | Performed by: ORTHOPAEDIC SURGERY

## 2024-05-22 NOTE — PROGRESS NOTES
[facility], Orthopedics and Sports Medicine  [facility]    Established Patient Office Visit- New Problem  05/22/2024       Subjective:      Joshua Luna is a 53 y.o. male who presents for new issue, left elbow.  The patient was picking up a heavy part of tree and had pop and pain left elbow about 2.5 months ago. He continues to work, but has pain medial elbow at the epicondyle.  He has difficulty flexing the elbow due to pain.    Some paraesthesia in the dorsal and ulnar hand noted.     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     Outside reports reviewed: historical medical records and office notes.    Past Medical History:   Diagnosis Date    Arthritis     Dry eyes     Dry mouth     Hypertension     Personal history of colonic polyps 12/17/2021       Patient Active Problem List   Diagnosis    Other and unspecified nonspecific immunological findings    Fatigue    Joint pain    Personal history of colonic polyps    Primary osteoarthritis of left knee       Past Surgical History:   Procedure Laterality Date    BONE GRAFT  06/2021    COLONOSCOPY N/A 12/17/2021    Procedure: COLONOSCOPY;  Surgeon: Yumiko Donald MD;  Location: [facility];  Service: Endoscopy;  Laterality: N/A;    ELBOW SURGERY  2006    right surgery    KNEE SURGERY  2009    6    ROTATOR CUFF REPAIR  [phone]    right shoulder    VASECTOMY  2003        Current Outpatient Medications   Medication Instructions    diclofenac sodium (VOLTAREN) 2 g, Topical (Top), 3 times daily    ipratropium (ATROVENT) 21 mcg (0.03 %) nasal spray Use 2 sprays by Each Nostril route 3 (three) times daily.    omeprazole (PRILOSEC) 40 mg, Oral, Daily, As needed.    predniSONE (DELTASONE) 10 MG tablet 3 tablets by mouth for 3 days then 2 tablets by mouth for 3 days then 1 tablet by mouth for the 3 days.    valsartan (DIOVAN) 80 mg, Oral, Daily        Review of patient's allergies indicates:  No Known Allergies    Social History      Socioeconomic History    Marital status:    Tobacco Use    Smoking status: Never     Passive exposure: Never    Smokeless tobacco: Never   Substance and Sexual Activity    Alcohol use: Yes     Comment: occasional    Drug use: No       Family History   Problem Relation Name Age of Onset    Cancer Mother          Breast & colon    Arthritis Mother      Bell's palsy Mother      Rheum arthritis Mother      Diabetes Father      Arthritis Father      Pulmonary fibrosis Father      Hypertension Father      Arthritis Sister      Asthma Daughter      Eczema Daughter      Asthma Son      Narcolepsy Sister      No Known Problems Sister      No Known Problems Sister      No Known Problems Sister           Review of Systems   Constitutional: Negative for chills and fever.   HENT:  Negative for hearing loss.    Eyes:  Negative for blurred vision.   Cardiovascular:  Negative for chest pain.   Respiratory:  Negative for shortness of breath.    Gastrointestinal:  Negative for abdominal pain.   Neurological:  Negative for light-headedness.          Objective:      General    Nursing note and vitals reviewed.  Constitutional: He is oriented to person, place, and time. He appears well-developed and well-nourished.   HENT:   Head: Normocephalic and atraumatic.   Eyes: Pupils are equal, round, and reactive to light.   Cardiovascular:  Normal rate and regular rhythm.            Pulmonary/Chest: Effort normal.   Abdominal: Soft.   Neurological: He is oriented to person, place, and time.   Psychiatric: He has a normal mood and affect. His behavior is normal.             Right Hand/Wrist Exam     Other     Neuorologic Exam    Median Distribution: normal  Ulnar Distribution: normal  Radial Distribution: normal      Left Hand/Wrist Exam     Other     Sensory Exam  Median Distribution: normal  Ulnar Distribution: normal  Radial Distribution: normal      Right Elbow Exam     Inspection   Scars: absent  Deformity: absent    Range of  Motion   Extension:  0   Flexion:  130   Pronation:  normal   Supination:  normal     Other   Sensation: normal      Left Elbow Exam     Inspection   Scars: absent  Deformity: absent    Tenderness   The patient is tender to palpation of the medial epicondyle.     Range of Motion   Extension:  0   Flexion:  130   Pronation:  normal   Supination:  normal     Other   Sensation: normal    Comments:  Pain with resisted flexion         Muscle Strength   Right Upper Extremity   Elbow Pronation:  5/5   Elbow Supination:  5/5   Elbow Extension: 5/5  Elbow Flexion: 5/5  Left Upper Extremity  Elbow Pronation:  5/5   Elbow Supination:  5/5   Elbow Extension: 5/5  Elbow Flexion: 5/5    Vascular Exam     Right Pulses      Radial:                    2+      Left Pulses      Radial:                    2+      Capillary Refill  Right Hand: normal capillary refill  Left Hand: normal capillary refill          Imaging:  Radiographs of the left elbow taken 05/22/2024 were personally reviewed from the [facility] EMR.  Multiple views of the elbow are available today for review, including an AP, lateral, and oblique view.  The ulnohumeral joint demonstrates minimal degenerative changes.  The radiocapitellar joint demonstrates minimal degenerative changes.  There is no acute fracture or dislocation.  No loose body or foreign objects noted.       Procedures        Assessment:       Joshua Luna is a 53 y.o. male seen in the office today. Diagnoses of Medial epicondylitis of left elbow and Strain of other flexor muscle, fascia and tendon at forearm level, left arm, initial encounter were pertinent to this visit.  Non-operative treatment is recommended at this time. Recommended physical therapy and topical nsaid.  If no improvement or worse at follow up will consider MRI. The natural history and expected course discussed with patient. Various treatment options were discussed, including their risks and benefits. All of the patient's questions  were answered.     Plan:      Physical therapy and rehabilitation treatment.  Voltaren 1% topical gel, apply to affected area TID, PRN pain.  Follow up in 6 weeks.          Bob Casas IV, MD   of Clinical Orthopedics  Department of Orthopedic Surgery  [facility]  Office: [phone]  Website: [email or web address]    ---------------------------------------  Orders Placed This Encounter    Ambulatory referral/consult to Physical/Occupational Therapy

## 2024-08-31 ENCOUNTER — PATIENT MESSAGE (OUTPATIENT)
Dept: FAMILY MEDICINE | Facility: CLINIC | Age: 54
End: 2024-08-31
Payer: COMMERCIAL

## 2024-09-07 RX ORDER — SEMAGLUTIDE 0.25 MG/.5ML
0.25 INJECTION, SOLUTION SUBCUTANEOUS
Qty: 4 ML | Refills: 0 | Status: SHIPPED | OUTPATIENT
Start: 2024-09-07

## 2024-09-09 ENCOUNTER — TELEPHONE (OUTPATIENT)
Dept: FAMILY MEDICINE | Facility: CLINIC | Age: 54
End: 2024-09-09
Payer: COMMERCIAL

## 2024-09-23 ENCOUNTER — PATIENT MESSAGE (OUTPATIENT)
Dept: PAIN MEDICINE | Facility: CLINIC | Age: 54
End: 2024-09-23
Payer: COMMERCIAL

## 2024-09-25 NOTE — TELEPHONE ENCOUNTER
Patient scheduled his appointment through the [facility] to be seen in clinic on 10/1 at [facility]

## 2024-09-30 ENCOUNTER — OFFICE VISIT (OUTPATIENT)
Dept: PAIN MEDICINE | Facility: CLINIC | Age: 54
End: 2024-09-30
Payer: COMMERCIAL

## 2024-09-30 VITALS
OXYGEN SATURATION: 100 % | HEART RATE: 91 BPM | RESPIRATION RATE: 18 BRPM | DIASTOLIC BLOOD PRESSURE: 89 MMHG | SYSTOLIC BLOOD PRESSURE: 134 MMHG | TEMPERATURE: 91 F

## 2024-09-30 DIAGNOSIS — M47.816 LUMBAR SPONDYLOSIS: ICD-10-CM

## 2024-09-30 DIAGNOSIS — M46.1 BILATERAL SACROILIITIS: Primary | ICD-10-CM

## 2024-09-30 DIAGNOSIS — M54.50 LUMBAR PAIN: ICD-10-CM

## 2024-09-30 DIAGNOSIS — M51.369 DDD (DEGENERATIVE DISC DISEASE), LUMBAR: ICD-10-CM

## 2024-09-30 PROCEDURE — 3075F SYST BP GE 130 - 139MM HG: CPT | Mod: CPTII,S$GLB,, | Performed by: STUDENT IN AN ORGANIZED HEALTH CARE EDUCATION/TRAINING PROGRAM

## 2024-09-30 PROCEDURE — 99999 PR PBB SHADOW E&M-EST. PATIENT-LVL III: CPT | Mod: PBBFAC,,, | Performed by: STUDENT IN AN ORGANIZED HEALTH CARE EDUCATION/TRAINING PROGRAM

## 2024-09-30 PROCEDURE — 3079F DIAST BP 80-89 MM HG: CPT | Mod: CPTII,S$GLB,, | Performed by: STUDENT IN AN ORGANIZED HEALTH CARE EDUCATION/TRAINING PROGRAM

## 2024-09-30 PROCEDURE — 1159F MED LIST DOCD IN RCRD: CPT | Mod: CPTII,S$GLB,, | Performed by: STUDENT IN AN ORGANIZED HEALTH CARE EDUCATION/TRAINING PROGRAM

## 2024-09-30 PROCEDURE — 3044F HG A1C LEVEL LT 7.0%: CPT | Mod: CPTII,S$GLB,, | Performed by: STUDENT IN AN ORGANIZED HEALTH CARE EDUCATION/TRAINING PROGRAM

## 2024-09-30 PROCEDURE — 1160F RVW MEDS BY RX/DR IN RCRD: CPT | Mod: CPTII,S$GLB,, | Performed by: STUDENT IN AN ORGANIZED HEALTH CARE EDUCATION/TRAINING PROGRAM

## 2024-09-30 PROCEDURE — 99214 OFFICE O/P EST MOD 30 MIN: CPT | Mod: S$GLB,,, | Performed by: STUDENT IN AN ORGANIZED HEALTH CARE EDUCATION/TRAINING PROGRAM

## 2024-09-30 PROCEDURE — 4010F ACE/ARB THERAPY RXD/TAKEN: CPT | Mod: CPTII,S$GLB,, | Performed by: STUDENT IN AN ORGANIZED HEALTH CARE EDUCATION/TRAINING PROGRAM

## 2024-09-30 NOTE — H&P (VIEW-ONLY)
Chronic Pain - New Consult    Referring Physician: No ref. provider found    Chief Complaint:   Chief Complaint   Patient presents with    Back Pain     Interval History 9/30/2024:   Joshua Luna is following up in the clinic for lower back pain that is worst on the left side. He said there is some radiation of the pain down both of his leg but does not go past his knees that is also mostly on the left. Patient did not participate in physical therapy due to it being painful. He said he has some relief with the steroid pack that was given. Pain score today is 6/10. The pain is worse with standing and prolonged walking. The pain is better with rest. Denied any fevers, weight loss, night sweats, saddle anesthesia or bowel incontinence.     Prior History:  Joshua Luna presents to the clinic for the evaluation of lower back and left leg pain. The pain started years (when 14 years old) ago following no inciting event and symptoms have been worsening.The pain is located in the lower back area and radiates to the left leg (back of thigh).  The pain is described as sharp, shooting, and tingling and is rated as 6/10. The pain is rated with a score of  2/10 on the BEST day and a score of 8/10 on the WORST day.  Symptoms interfere with daily activity, sleeping, and work. The pain is exacerbated by Sitting and Getting out of bed/chair.  The pain is mitigated by standing/stretching. The patient reports 4-6 hours of uninterrupted sleep per night.    Patient denies urinary incontinence and bowel incontinence. He reports that the pain was severe in his 20s, but only recently started affecting him. He went to a chiropractor in the early 90s but it didn't help. He also had left knee surgery which also contributes to his pain and some left knee weakness (sometimes the knee sergio)    Physical Therapy/Home Exercise: yes, years ago, continues to do [facility] spine conditioning program as given from [facility] in November 2023      Pain Disability  Index Review:      5/21/2024     8:38 AM   Last 3 PDI Scores   Pain Disability Index (PDI) 42     Pain Medications:   - ibuprofen/tylenol (helps a little)     report:  Reviewed and consistent with medication use as prescribed.    Pain Procedures:   None    Imaging:   MRI LUMBAR SPINE WITHOUT CONTRAST     Vertebrae: 5 lumbar-type vertebral bodies. No aggressive marrow replacement process or fracture.Nobone marrow edema .     Discs: Satisfactory height and signal.     Cord: Normal. Conus terminates at L1-L2.     Degenerative findings:     T12-L1: There is no focal disc herniation. No significant central canal narrowing . No significant neural foraminal narrowing.     L1-L2: Broad-based subligamentous disc extrusion with effacement of the anterior thecal sac margins.  Facet hypertrophy with facet fluid lateralizing RIGHT..  Mild central canal narrowing . No significant neural foraminal narrowing.     L2-L3: There is no focal disc herniation.Mild facet hypertrophy.  No significant central canal narrowing . No significant neural foraminal narrowing.     L3-L4: There is no focal disc herniation. No significant central canal narrowing . No significant neural foraminal narrowing.     L4-L5: There is no focal disc herniation. No significant central canal narrowing . No significant neural foraminal narrowing.     L5-S1: Grade 1 anterolisthesis with spondylolysis.   There is no focal disc herniation. No significant central canal narrowing . Mild LEFT greater than RIGHT neural foraminal narrowing.     Paraspinal muscles & soft tissues: Unremarkable.     Impression:     Broad-based disc extrusion subligamentous L1-L2 with effacement of the anterior thecal sac margins, and mild facet hypertrophy results in mild central canal narrowing.     Grade 1 anterolisthesis best seen on radiograph of 01/06/2024 with associated spondylolysis    XR LUMBAR SPINE AP AND LAT WITH FLEX/EXT     FINDINGS:  There is a grade 1 L5/S1  anterolisthesis with probably an associated spondylolysis..  The lumbosacral disc space is narrowed.  The other disc spaces are well maintained.  Mild DJD.  No acute fracture or bone destruction identified     Impression:     See above    Past Medical History:   Diagnosis Date    Arthritis     Dry eyes     Dry mouth     Hypertension     Personal history of colonic polyps 12/17/2021     Past Surgical History:   Procedure Laterality Date    BONE GRAFT  06/2021    COLONOSCOPY N/A 12/17/2021    Procedure: COLONOSCOPY;  Surgeon: Yumiko Donald MD;  Location: [facility];  Service: Endoscopy;  Laterality: N/A;    ELBOW SURGERY  2006    right surgery    KNEE SURGERY  2009    5    ROTATOR CUFF REPAIR  [id number]    right shoulder    VASECTOMY  2003     Social History     Socioeconomic History    Marital status:    Tobacco Use    Smoking status: Never     Passive exposure: Never    Smokeless tobacco: Never   Substance and Sexual Activity    Alcohol use: Yes     Comment: occasional    Drug use: No     Family History   Problem Relation Name Age of Onset    Cancer Mother          Breast & colon    Arthritis Mother      Bell's palsy Mother      Rheum arthritis Mother      Diabetes Father      Arthritis Father      Pulmonary fibrosis Father      Hypertension Father      Arthritis Sister      Asthma Daughter      Eczema Daughter      Asthma Son      Narcolepsy Sister      No Known Problems Sister      No Known Problems Sister      No Known Problems Sister         Review of patient's allergies indicates:  No Known Allergies    Current Outpatient Medications   Medication Sig    diclofenac sodium (VOLTAREN) 1 % Gel Apply 2 g topically 3 (three) times daily.    ipratropium (ATROVENT) 21 mcg (0.03 %) nasal spray Use 2 sprays by Each Nostril route 3 (three) times daily.    omeprazole (PRILOSEC) 40 MG capsule Take 1 capsule (40 mg total) by mouth once daily. As needed.    valsartan (DIOVAN) 80 MG tablet Take 1 tablet (80 mg  total) by mouth once daily.    predniSONE (DELTASONE) 10 MG tablet 3 tablets by mouth for 3 days then 2 tablets by mouth for 3 days then 1 tablet by mouth for the 3 days. (Patient not taking: Reported on 9/30/2024)    semaglutide, weight loss, (WEGOVY) 0.25 mg/0.5 mL PnIj Inject 0.25 mg into the skin every 7 days.     No current facility-administered medications for this visit.       REVIEW OF SYSTEMS:    GENERAL:  current fevers or chills, recent use of antibiotics denies.  HEENT:  History of migraines/headaches: denies, History of major throat surgery: denies  RESPIRATORY:  History of home oxygen or pulmonary hypertension/severe breathing dysfunction: denies  CARDIOVASCULAR:  Hx of palpitations/rhythm problems: denies Hx of Heart Attacks/Surgery: denies  GI:  Recent abdominal discomfort or recent change in bowel habits denies  MUSCULOSKELETAL:  See HPI.  SKIN:  unhealed wounds or rashes: denies  PSYCH: major psychiatric history or recent psychosocial stressors denies  HEMATOLOGY/LYMPHOLOGY:  Hx of prolonged bleeding, Hx of Blood thinner usage: denies  NEURO:   history of seizures, strokes, chronic/old weakness (such as paralysis or paresis of any body part): denies  All other reviewed and negative other than HPI.    OBJECTIVE:    /89   Pulse 91   Temp (!) 91 °F (32.8 °C)   Resp 18   SpO2 100%     PHYSICAL EXAMINATION:  General appearance: Well appearing, in no acute distress, alert and appropriately communicative.  Psych:  Mood and affect appropriate.  Skin: Skin color, texture, turgor normal, no rashes or lesions, in both upper and lower body for exposed skin.  Head/face:  Atraumatic, normocephalic.  Cor: regular rate  Pulm: non-labored breathing  GI: Abdomen non-distended and non-tender.  Back: Straight leg raising in the sitting and supine positions is negative to radicular pain. Pain with lumbar extension and facet loading.  Extremities: No deformities, significant lymphedema, or skin discoloration.  No significant open wounds. No major amputations.  Musculoskeletal:   hip, sacroiliac and knee provocative maneuvers are negative with the exception of - SI Tenderness bilateral +, SI Distraction left > right +, ANA MARIA left > right +, [place] left > right +, Yeoman's left > right +, Pelvic Compression left > right +, Clara Finger left > right +, SI thrust left > right +, Thigh thrust left > right +. Bilateral upper and lower extremity strength is normal and symmetric.  No atrophy or tone abnormalities are noted.  Neuro: Bilateral upper and lower extremity coordination and muscle stretch reflexes abnormalities noted: none.  Kirk and/or Clonus: negative; loss of sensation to light touch: none  Gait: none    CMP  Sodium   Date Value Ref Range Status   05/15/2023 137 136 - 145 mmol/L Final     Potassium   Date Value Ref Range Status   05/15/2023 4.2 3.5 - 5.1 mmol/L Final     Chloride   Date Value Ref Range Status   05/15/2023 104 95 - 110 mmol/L Final     CO2   Date Value Ref Range Status   05/15/2023 25 23 - 29 mmol/L Final     Glucose   Date Value Ref Range Status   05/15/2023 112 (H) 70 - 110 mg/dL Final     BUN   Date Value Ref Range Status   05/15/2023 11 2 - 20 mg/dL Final     Creatinine   Date Value Ref Range Status   05/15/2023 0.85 0.50 - 1.40 mg/dL Final     Calcium   Date Value Ref Range Status   05/15/2023 9.1 8.7 - 10.5 mg/dL Final     Total Protein   Date Value Ref Range Status   01/27/2023 7.4 6.0 - 8.4 g/dL Final     Albumin   Date Value Ref Range Status   01/27/2023 4.8 3.5 - 5.2 g/dL Final     Total Bilirubin   Date Value Ref Range Status   01/27/2023 0.5 0.1 - 1.0 mg/dL Final     Comment:     For infants and newborns, interpretation of results should be based  on gestational age, weight and in agreement with clinical  observations.    Premature Infant recommended reference ranges:  Up to 24 hours.............<8.0 mg/dL  Up to 48 hours............<12.0 mg/dL  3-5 days..................<15.0  mg/dL  6-29 days.................<15.0 mg/dL       Alkaline Phosphatase   Date Value Ref Range Status   01/27/2023 66 38 - 126 U/L Final     AST   Date Value Ref Range Status   01/27/2023 42 15 - 46 U/L Final     ALT   Date Value Ref Range Status   01/27/2023 58 (H) 10 - 44 U/L Final     Anion Gap   Date Value Ref Range Status   05/15/2023 8 8 - 16 mmol/L Final     eGFR   Date Value Ref Range Status   05/15/2023 >60.0 >60 mL/min/1.73 m^2 Final     ASSESSMENT: 54 y.o. year old male with lower back pain, consistent with:    1. Bilateral sacroiliitis  Procedure Order to Pain Management      2. Lumbar spondylosis  Procedure Order to Pain Management      3. Lumbar pain        4. DDD (degenerative disc disease), lumbar          IMPRESSION: Joshua Luna presents today for lower back pain and left hip pain.  History and physical exam are consistent with bilateral (left > right) sacroiliitis and left lumbar radiculopathy.  My independent interpretation of the imaging is consistent with lumbar degenerative disc disease with central canal narrowing at L1/2 and L5/S1.   Patient did receive limited pain relief with conservative measures and would like to consider injections.     PLAN:   - I have stressed the importance of physical activity and a home exercise plan to help with pain and improve health.  - Patient can continue with medications for now since they are providing benefits, using them appropriately, and without side effects.   - Can continue ibuprofen and tylenol as needed for his pain, as he finds these somewhat effective.  - Continue with home exercise therapy.   - Schedule for bilateral SI joint injection.   - Discussed MBB and RFA of the lumbar spine as a future therapy if SIJ injection is not beneficial.   - Follow up with the clinic 2 weeks after procedure.     The above plan and management options were discussed at length with patient. Patient is in agreement with the above and verbalized understanding. It will be  communicated with the referring physician via electronic record, fax, or mail.    Colt Klein MD PGY-5  Interventional Pain Medicine Fellow   [facility]      I have personally reviewed the history and exam of this patient and agree with the student/resident/fellow/NPs note as stated above.  I have seen the patient and personally examined them as appropriate.  I have personally discussed the plan of care with the provider and patient and have reviewed and/or edited the plan of care as appropriate.  All questions have been answered to the best of my ability.    Martín Rivas MD PhD

## 2024-09-30 NOTE — PROGRESS NOTES
Chronic Pain - New Consult    Referring Physician: No ref. provider found    Chief Complaint:   Chief Complaint   Patient presents with    Back Pain     Interval History 9/30/2024:   Joshua Luna is following up in the clinic for lower back pain that is worst on the left side. He said there is some radiation of the pain down both of his leg but does not go past his knees that is also mostly on the left. Patient did not participate in physical therapy due to it being painful. He said he has some relief with the steroid pack that was given. Pain score today is 6/10. The pain is worse with standing and prolonged walking. The pain is better with rest. Denied any fevers, weight loss, night sweats, saddle anesthesia or bowel incontinence.     Prior History:  Joshua Luna presents to the clinic for the evaluation of lower back and left leg pain. The pain started years (when 14 years old) ago following no inciting event and symptoms have been worsening.The pain is located in the lower back area and radiates to the left leg (back of thigh).  The pain is described as sharp, shooting, and tingling and is rated as 6/10. The pain is rated with a score of  2/10 on the BEST day and a score of 8/10 on the WORST day.  Symptoms interfere with daily activity, sleeping, and work. The pain is exacerbated by Sitting and Getting out of bed/chair.  The pain is mitigated by standing/stretching. The patient reports 4-6 hours of uninterrupted sleep per night.    Patient denies urinary incontinence and bowel incontinence. He reports that the pain was severe in his 20s, but only recently started affecting him. He went to a chiropractor in the early 90s but it didn't help. He also had left knee surgery which also contributes to his pain and some left knee weakness (sometimes the knee sergio)    Physical Therapy/Home Exercise: yes, years ago, continues to do [facility] spine conditioning program as given from [facility] in November 2023      Pain Disability  Index Review:      5/21/2024     8:38 AM   Last 3 PDI Scores   Pain Disability Index (PDI) 42     Pain Medications:   - ibuprofen/tylenol (helps a little)     report:  Reviewed and consistent with medication use as prescribed.    Pain Procedures:   None    Imaging:   MRI LUMBAR SPINE WITHOUT CONTRAST     Vertebrae: 5 lumbar-type vertebral bodies. No aggressive marrow replacement process or fracture.Nobone marrow edema .     Discs: Satisfactory height and signal.     Cord: Normal. Conus terminates at L1-L2.     Degenerative findings:     T12-L1: There is no focal disc herniation. No significant central canal narrowing . No significant neural foraminal narrowing.     L1-L2: Broad-based subligamentous disc extrusion with effacement of the anterior thecal sac margins.  Facet hypertrophy with facet fluid lateralizing RIGHT..  Mild central canal narrowing . No significant neural foraminal narrowing.     L2-L3: There is no focal disc herniation.Mild facet hypertrophy.  No significant central canal narrowing . No significant neural foraminal narrowing.     L3-L4: There is no focal disc herniation. No significant central canal narrowing . No significant neural foraminal narrowing.     L4-L5: There is no focal disc herniation. No significant central canal narrowing . No significant neural foraminal narrowing.     L5-S1: Grade 1 anterolisthesis with spondylolysis.   There is no focal disc herniation. No significant central canal narrowing . Mild LEFT greater than RIGHT neural foraminal narrowing.     Paraspinal muscles & soft tissues: Unremarkable.     Impression:     Broad-based disc extrusion subligamentous L1-L2 with effacement of the anterior thecal sac margins, and mild facet hypertrophy results in mild central canal narrowing.     Grade 1 anterolisthesis best seen on radiograph of 01/06/2024 with associated spondylolysis    XR LUMBAR SPINE AP AND LAT WITH FLEX/EXT     FINDINGS:  There is a grade 1 L5/S1  anterolisthesis with probably an associated spondylolysis..  The lumbosacral disc space is narrowed.  The other disc spaces are well maintained.  Mild DJD.  No acute fracture or bone destruction identified     Impression:     See above    Past Medical History:   Diagnosis Date    Arthritis     Dry eyes     Dry mouth     Hypertension     Personal history of colonic polyps 12/17/2021     Past Surgical History:   Procedure Laterality Date    BONE GRAFT  06/2021    COLONOSCOPY N/A 12/17/2021    Procedure: COLONOSCOPY;  Surgeon: Yumiko Donald MD;  Location: [facility];  Service: Endoscopy;  Laterality: N/A;    ELBOW SURGERY  2006    right surgery    KNEE SURGERY  2009    5    ROTATOR CUFF REPAIR  [id number]    right shoulder    VASECTOMY  2003     Social History     Socioeconomic History    Marital status:    Tobacco Use    Smoking status: Never     Passive exposure: Never    Smokeless tobacco: Never   Substance and Sexual Activity    Alcohol use: Yes     Comment: occasional    Drug use: No     Family History   Problem Relation Name Age of Onset    Cancer Mother          Breast & colon    Arthritis Mother      Bell's palsy Mother      Rheum arthritis Mother      Diabetes Father      Arthritis Father      Pulmonary fibrosis Father      Hypertension Father      Arthritis Sister      Asthma Daughter      Eczema Daughter      Asthma Son      Narcolepsy Sister      No Known Problems Sister      No Known Problems Sister      No Known Problems Sister         Review of patient's allergies indicates:  No Known Allergies    Current Outpatient Medications   Medication Sig    diclofenac sodium (VOLTAREN) 1 % Gel Apply 2 g topically 3 (three) times daily.    ipratropium (ATROVENT) 21 mcg (0.03 %) nasal spray Use 2 sprays by Each Nostril route 3 (three) times daily.    omeprazole (PRILOSEC) 40 MG capsule Take 1 capsule (40 mg total) by mouth once daily. As needed.    valsartan (DIOVAN) 80 MG tablet Take 1 tablet (80 mg  total) by mouth once daily.    predniSONE (DELTASONE) 10 MG tablet 3 tablets by mouth for 3 days then 2 tablets by mouth for 3 days then 1 tablet by mouth for the 3 days. (Patient not taking: Reported on 9/30/2024)    semaglutide, weight loss, (WEGOVY) 0.25 mg/0.5 mL PnIj Inject 0.25 mg into the skin every 7 days.     No current facility-administered medications for this visit.       REVIEW OF SYSTEMS:    GENERAL:  current fevers or chills, recent use of antibiotics denies.  HEENT:  History of migraines/headaches: denies, History of major throat surgery: denies  RESPIRATORY:  History of home oxygen or pulmonary hypertension/severe breathing dysfunction: denies  CARDIOVASCULAR:  Hx of palpitations/rhythm problems: denies Hx of Heart Attacks/Surgery: denies  GI:  Recent abdominal discomfort or recent change in bowel habits denies  MUSCULOSKELETAL:  See HPI.  SKIN:  unhealed wounds or rashes: denies  PSYCH: major psychiatric history or recent psychosocial stressors denies  HEMATOLOGY/LYMPHOLOGY:  Hx of prolonged bleeding, Hx of Blood thinner usage: denies  NEURO:   history of seizures, strokes, chronic/old weakness (such as paralysis or paresis of any body part): denies  All other reviewed and negative other than HPI.    OBJECTIVE:    /89   Pulse 91   Temp (!) 91 °F (32.8 °C)   Resp 18   SpO2 100%     PHYSICAL EXAMINATION:  General appearance: Well appearing, in no acute distress, alert and appropriately communicative.  Psych:  Mood and affect appropriate.  Skin: Skin color, texture, turgor normal, no rashes or lesions, in both upper and lower body for exposed skin.  Head/face:  Atraumatic, normocephalic.  Cor: regular rate  Pulm: non-labored breathing  GI: Abdomen non-distended and non-tender.  Back: Straight leg raising in the sitting and supine positions is negative to radicular pain. Pain with lumbar extension and facet loading.  Extremities: No deformities, significant lymphedema, or skin discoloration.  No significant open wounds. No major amputations.  Musculoskeletal:   hip, sacroiliac and knee provocative maneuvers are negative with the exception of - SI Tenderness bilateral +, SI Distraction left > right +, ANA MARIA left > right +, Gaenslen left > right +, Yeoman's left > right +, Pelvic Compression left > right +, Clara Finger left > right +, SI thrust left > right +, Thigh thrust left > right +. Bilateral upper and lower extremity strength is normal and symmetric.  No atrophy or tone abnormalities are noted.  Neuro: Bilateral upper and lower extremity coordination and muscle stretch reflexes abnormalities noted: none.  Kirk and/or Clonus: negative; loss of sensation to light touch: none  Gait: none    CMP  Sodium   Date Value Ref Range Status   05/15/2023 137 136 - 145 mmol/L Final     Potassium   Date Value Ref Range Status   05/15/2023 4.2 3.5 - 5.1 mmol/L Final     Chloride   Date Value Ref Range Status   05/15/2023 104 95 - 110 mmol/L Final     CO2   Date Value Ref Range Status   05/15/2023 25 23 - 29 mmol/L Final     Glucose   Date Value Ref Range Status   05/15/2023 112 (H) 70 - 110 mg/dL Final     BUN   Date Value Ref Range Status   05/15/2023 11 2 - 20 mg/dL Final     Creatinine   Date Value Ref Range Status   05/15/2023 0.85 0.50 - 1.40 mg/dL Final     Calcium   Date Value Ref Range Status   05/15/2023 9.1 8.7 - 10.5 mg/dL Final     Total Protein   Date Value Ref Range Status   01/27/2023 7.4 6.0 - 8.4 g/dL Final     Albumin   Date Value Ref Range Status   01/27/2023 4.8 3.5 - 5.2 g/dL Final     Total Bilirubin   Date Value Ref Range Status   01/27/2023 0.5 0.1 - 1.0 mg/dL Final     Comment:     For infants and newborns, interpretation of results should be based  on gestational age, weight and in agreement with clinical  observations.    Premature Infant recommended reference ranges:  Up to 24 hours.............<8.0 mg/dL  Up to 48 hours............<12.0 mg/dL  3-5 days..................<15.0  mg/dL  6-29 days.................<15.0 mg/dL       Alkaline Phosphatase   Date Value Ref Range Status   01/27/2023 66 38 - 126 U/L Final     AST   Date Value Ref Range Status   01/27/2023 42 15 - 46 U/L Final     ALT   Date Value Ref Range Status   01/27/2023 58 (H) 10 - 44 U/L Final     Anion Gap   Date Value Ref Range Status   05/15/2023 8 8 - 16 mmol/L Final     eGFR   Date Value Ref Range Status   05/15/2023 >60.0 >60 mL/min/1.73 m^2 Final     ASSESSMENT: 54 y.o. year old male with lower back pain, consistent with:    1. Bilateral sacroiliitis  Procedure Order to Pain Management      2. Lumbar spondylosis  Procedure Order to Pain Management      3. Lumbar pain        4. DDD (degenerative disc disease), lumbar          IMPRESSION: Joshua Luna presents today for lower back pain and left hip pain.  History and physical exam are consistent with bilateral (left > right) sacroiliitis and left lumbar radiculopathy.  My independent interpretation of the imaging is consistent with lumbar degenerative disc disease with central canal narrowing at L1/2 and L5/S1.   Patient did receive limited pain relief with conservative measures and would like to consider injections.     PLAN:   - I have stressed the importance of physical activity and a home exercise plan to help with pain and improve health.  - Patient can continue with medications for now since they are providing benefits, using them appropriately, and without side effects.   - Can continue ibuprofen and tylenol as needed for his pain, as he finds these somewhat effective.  - Continue with home exercise therapy.   - Schedule for bilateral SI joint injection.   - Discussed MBB and RFA of the lumbar spine as a future therapy if SIJ injection is not beneficial.   - Follow up with the clinic 2 weeks after procedure.     The above plan and management options were discussed at length with patient. Patient is in agreement with the above and verbalized understanding. It will be  communicated with the referring physician via electronic record, fax, or mail.    Colt Klein MD PGY-5  Interventional Pain Medicine Fellow   [facility]      I have personally reviewed the history and exam of this patient and agree with the student/resident/fellow/NPs note as stated above.  I have seen the patient and personally examined them as appropriate.  I have personally discussed the plan of care with the provider and patient and have reviewed and/or edited the plan of care as appropriate.  All questions have been answered to the best of my ability.    Martín Rivas MD PhD

## 2024-10-01 ENCOUNTER — TELEPHONE (OUTPATIENT)
Dept: PAIN MEDICINE | Facility: CLINIC | Age: 54
End: 2024-10-01
Payer: COMMERCIAL

## 2024-10-01 DIAGNOSIS — M46.1 BILATERAL SACROILIITIS: Primary | ICD-10-CM

## 2024-10-11 ENCOUNTER — TELEPHONE (OUTPATIENT)
Dept: PAIN MEDICINE | Facility: CLINIC | Age: 54
End: 2024-10-11
Payer: COMMERCIAL

## 2024-10-16 NOTE — PRE-PROCEDURE INSTRUCTIONS
Patient reviewed on 10/16/2024.  Okay to proceed at [place]. The following pre-procedure instructions and arrival time have been reviewed with patient via phone and sent to patient portal for review.  Patient verbalized an understanding.  Pt to be accompanied by wife-Abeba day of procedure as responsible .      Dear Joshua,     Please read over the following pre-procedure instructions in it's entirety as there is helpful information here to get you well prepared for your upcoming procedure.     You are scheduled for a procedure with Dr. YONI Rivas on 10/18/2024.     [facility] at the corner of [facility] and [facility]. It is in the [place] next to [employer]. The address is: [address] [place] [address]. Take the elevator to the 2nd floor.       Registration check in time: 11:30 am  Procedure scheduled for time: 1:00 pm     If you are receiving sedation, you CANNOT drive yourself and must have a responsible friend or family member (no rideshare) to drive you home.        You should take any medications that you routinely take for blood pressure, heart medications, thyroid, cholesterol, etc.      The fasting restrictions are dependent on whether or not you are receiving sedation. Sedation is not available for all procedures.      Your fasting instructions/Sedation type are as follow:  Oral Sedation. You do not need to fast before this procedure.  You can eat and drink like normal.  You CANNOT drive yourself and must have a .        If you are on blood thinners, you need to follow the anticoagulation instructions that had been discussed previously. You should only stop the blood thinners if it was approved by your primary care physician or your cardiologist. In the event that you are not able to stop your blood thinners, a blood thinner was not listed on your medication list, or we were not able to get clearance from your cardiologist, then the procedure may have to be  postponed/canceled.      IF you were told to stop your blood thinners, this is how long you should generally hold some of the more common ones. Remember that stopping blood thinners is only necessary for certain procedures. If you are unsure of your instructions, please call us.   Aspirin - 5 days  Plavix/Clopidogrel - 7 days  Warfarin / Coumadin - 5 days  Eliquis - 3 days  Pradaxa/Dabigatran - 4 days  Xarelto/Rivaroxaban - 3 days     If you are a diabetic, do not take your medication if you will be fasting, but bring it with you. Please plan on being here for roughly 2-3 hours.     Please call us if you have been sick (running fever, having any flu-like symptoms) or have been taking ANTIBIOTICS in the past 2 weeks or had any outpatient procedures other than with us (colonoscopy, endoscopy, OBGYN, dental, etc.).      If you have been previously COVID positive, you will need to hold off on your procedure until you are symptom free for 10 days. If you did not have any symptoms, you can have your procedure 10 days from your positive test result.         On the morning of your procedure:  *HOLD ALL VITAMINS, MINERALS, HERBS (INCLUDING HERBAL TEAS) AND SUPPLEMENTS  *SHOWER WITH ANTIBACTERIAL SOAP (EX. DIAL) NIGHT BEFORE AND MORNING OF PROCEDURE  *DO NOT APPLY ANY LOTIONS, OILS, POWDERS, PERFUME/COLOGNE, OINTMENTS, GELS, CREAMS, MAKEUP OR DEODORANT TO YOUR SKIN MORNING OF PROCEDURE  *LEAVE JEWELRY AND ANY VALUABLES AT HOME  *WEAR LOOSE COMFORTABLE CLOTHING         Please reply to this portal message as receipt of delivery.     Thank you,  [facility]  [place], LPN [facility]  Pre-Admit

## 2024-10-18 ENCOUNTER — HOSPITAL ENCOUNTER (OUTPATIENT)
Facility: HOSPITAL | Age: 54
Discharge: HOME | End: 2024-10-18
Attending: STUDENT IN AN ORGANIZED HEALTH CARE EDUCATION/TRAINING PROGRAM | Admitting: STUDENT IN AN ORGANIZED HEALTH CARE EDUCATION/TRAINING PROGRAM
Payer: COMMERCIAL

## 2024-10-18 VITALS
SYSTOLIC BLOOD PRESSURE: 163 MMHG | TEMPERATURE: 98 F | DIASTOLIC BLOOD PRESSURE: 75 MMHG | HEIGHT: 69 IN | OXYGEN SATURATION: 98 % | WEIGHT: 205 LBS | HEART RATE: 67 BPM | RESPIRATION RATE: 16 BRPM | BODY MASS INDEX: 30.36 KG/M2

## 2024-10-18 DIAGNOSIS — G89.29 CHRONIC PAIN: ICD-10-CM

## 2024-10-18 DIAGNOSIS — M46.1 SACROILIITIS: Primary | ICD-10-CM

## 2024-10-18 PROCEDURE — 63600175 PHARM REV CODE 636 W HCPCS: Mod: JZ,JG | Performed by: STUDENT IN AN ORGANIZED HEALTH CARE EDUCATION/TRAINING PROGRAM

## 2024-10-18 PROCEDURE — 25000003 PHARM REV CODE 250: Performed by: STUDENT IN AN ORGANIZED HEALTH CARE EDUCATION/TRAINING PROGRAM

## 2024-10-18 PROCEDURE — 27096 INJECT SACROILIAC JOINT: CPT | Mod: 50,,, | Performed by: STUDENT IN AN ORGANIZED HEALTH CARE EDUCATION/TRAINING PROGRAM

## 2024-10-18 PROCEDURE — 27096 INJECT SACROILIAC JOINT: CPT | Mod: 50 | Performed by: STUDENT IN AN ORGANIZED HEALTH CARE EDUCATION/TRAINING PROGRAM

## 2024-10-18 PROCEDURE — 25500020 PHARM REV CODE 255: Performed by: STUDENT IN AN ORGANIZED HEALTH CARE EDUCATION/TRAINING PROGRAM

## 2024-10-18 RX ORDER — TRIAMCINOLONE ACETONIDE 40 MG/ML
INJECTION, SUSPENSION INTRA-ARTICULAR; INTRAMUSCULAR
Status: DISCONTINUED | OUTPATIENT
Start: 2024-10-18 | End: 2024-10-18 | Stop reason: HOSPADM

## 2024-10-18 RX ORDER — LIDOCAINE HYDROCHLORIDE 20 MG/ML
INJECTION, SOLUTION EPIDURAL; INFILTRATION; INTRACAUDAL; PERINEURAL
Status: DISCONTINUED | OUTPATIENT
Start: 2024-10-18 | End: 2024-10-18 | Stop reason: HOSPADM

## 2024-10-18 RX ORDER — ALPRAZOLAM 0.5 MG/1
0.5 TABLET, ORALLY DISINTEGRATING ORAL ONCE AS NEEDED
Status: COMPLETED | OUTPATIENT
Start: 2024-10-18 | End: 2024-10-18

## 2024-10-18 RX ORDER — BUPIVACAINE HYDROCHLORIDE 2.5 MG/ML
INJECTION, SOLUTION EPIDURAL; INFILTRATION; INTRACAUDAL
Status: DISCONTINUED | OUTPATIENT
Start: 2024-10-18 | End: 2024-10-18 | Stop reason: HOSPADM

## 2024-10-18 RX ORDER — ALPRAZOLAM 0.5 MG/1
0.5 TABLET, ORALLY DISINTEGRATING ORAL ONCE AS NEEDED
Status: DISCONTINUED | OUTPATIENT
Start: 2024-10-19 | End: 2024-10-18

## 2024-10-18 RX ADMIN — ALPRAZOLAM 0.5 MG: 0.5 TABLET, ORALLY DISINTEGRATING ORAL at 01:10

## 2024-10-18 NOTE — OP NOTE
Sacroiliac Joint Injection under Fluoroscopic Guidance    The procedure, risks, benefits, and options were discussed with the patient. There are no contraindications to the procedure. The patent expressed understanding and agreed to the procedure. Informed written consent was obtained prior to the start of the procedure and can be found in the patient's chart.    PATIENT NAME: Joshua Luna   MRN: [id number]     DATE OF PROCEDURE: 10/18/2024    PROCEDURE: Bilateral Sacroiliac Joint Injection under Fluoroscopic Guidance    PRE-OP DIAGNOSIS: Bilateral sacroiliitis [M46.1]    POST-OP DIAGNOSIS: Same    PHYSICIAN: Martín Rivas MD    ASSISTANTS: None     MEDICATIONS INJECTED: Preservative-free Kenalog 40mg with 3cc of Bupivacine 0.25%     LOCAL ANESTHETIC INJECTED: Xylocaine 2%     SEDATION: None    ESTIMATED BLOOD LOSS: None    COMPLICATIONS: None    TECHNIQUE: Time-out was performed to identify the patient and procedure to be performed. With the patient laying in a prone position, the surgical area was prepped and draped in the usual sterile fashion using ChloraPrep and a fenestrated drape. The sacroiliac joint was determined under fluoroscopy guidance. Skin anesthesia was achieved by injecting Lidocaine 2% over the injection site. The sacroiliac joint was  then approached with a 22 gauge, 3.5 inch spinal quinke needle that was introduced under fluoroscopic guidance in the AP and Lateral views. Once the needle tip was in the area of the joint, and there was no blood, contrast dye Omnipaque (300mg/mL) was injected to confirm placement and there was no vascular runoff. Fluoroscopic imaging in the AP and lateral views revealed a clear outline of the joint space. 4 mL of the medication mixture listed above was injected slowly intraarticular and regina-articular. Displacement of the radio opaque contrast after injection of the medication confirmed that the medication went into the area of the joint. The needles were removed and  bleeding was nil. The same procedure was repeated on the contralateral side. A sterile dressing was applied. No specimens collected. The patient tolerated the procedure well.     The patient was monitored after the procedure in the recovery area. They were given post-procedure and discharge instructions to follow at home. The patient was discharged in a stable condition.    Martín Rivas MD

## 2024-10-18 NOTE — DISCHARGE SUMMARY
Discharge Note  Short Stay      SUMMARY     Admit Date: 10/18/2024    Attending Physician: Matrín Rivas MD PhD    Discharge Physician: Martín Rivas      Discharge Date: 10/18/2024 2:12 PM    Procedure(s) (LRB):  Bilateral SI Joint Injection (Bilateral)    Final Diagnosis: Bilateral sacroiliitis [M46.1]    Disposition: Home or self care    Patient Instructions:   Current Discharge Medication List        CONTINUE these medications which have NOT CHANGED    Details   ipratropium (ATROVENT) 21 mcg (0.03 %) nasal spray Use 2 sprays by Each Nostril route 3 (three) times daily.  Qty: 90 mL, Refills: 3      omeprazole (PRILOSEC) 40 MG capsule Take 1 capsule (40 mg total) by mouth once daily. As needed.  Qty: 90 capsule, Refills: 3      valsartan (DIOVAN) 80 MG tablet Take 1 tablet (80 mg total) by mouth once daily.  Qty: 90 tablet, Refills: 3    Comments: .      diclofenac sodium (VOLTAREN) 1 % Gel Apply 2 g topically 3 (three) times daily.  Qty: 100 g, Refills: 2    Associated Diagnoses: Primary osteoarthritis of left knee           STOP taking these medications       predniSONE (DELTASONE) 10 MG tablet Comments:   Reason for Stopping:         semaglutide, weight loss, (WEGOVY) 0.25 mg/0.5 mL PnIj Comments:   Reason for Stopping:                   Discharge Diagnosis: Bilateral sacroiliitis [M46.1]  Condition on Discharge: Stable with no complications to procedure   Diet on Discharge: Same as before.  Activity: as per instruction sheet.  Discharge to: Home with a responsible adult.  Follow up: 2-4 weeks       Please call my office or pager at [phone] if experienced any weakness or loss of sensation, fever > 101.5, pain uncontrolled with oral medications, persistent nausea/vomiting/or diarrhea, redness or drainage from the incisions, or any other worrisome concerns. If physician on call was not reached or could not communicate with our office for any reason please go to the nearest emergency department      Martín Rivas  MD PhD

## 2024-10-18 NOTE — INTERVAL H&P NOTE
The patient was examined and no significant changes were noted from the updated H&P or last clinic note.    The risks and benefits of this procedure, including alternative therapies, were discussed with the patient.  The discussion of risks included infection, bleeding, need for additional procedures or surgery, nerve damage, paralysis, adverse medication reaction(s), stroke, and if appropriate for the procedure, death.  Questions regarding the procedure, risks, expected outcome, and possible side effects were solicited and answered to Joshua's satisfaction.  Joshua Luna wishes to proceed with the injection or procedure as confirmed by written consent.

## 2024-10-18 NOTE — DISCHARGE INSTRUCTIONS
[facility]  Dr. Martín Rivas  Messaging service # [phone]    POST-PROCEDURE INSTRUCTIONS:  HELPFUL TIPS:    Except for block procedures (medial branch blocks, genicular blocks, hip/shoulder blocks), most procedures take about 2 weeks to start seeing the full effect toward your pain.  If you feel like the pain relief goes away after a day, please wait up to 2 weeks to decide if the procedure provided you any relief    If you had a block procedure (medial branch blocks, genicular blocks, hip/shoulder blocks), you MUST submit your pain diary and percentage relief scores to proceed with the next block and/or procedure.  Please submit the diary/percentages through the [facility] OR you can call [phone] ([employer]) OR [phone] ([employer]) during clinic hours (Monday - Friday, 8AM - 5PM)    Follow up in 2 weeks with either the provider that suggested this procedure OR with Dr. Rivas (including his team members at [facility]).  You may already have a follow up appointment scheduled.  If not, you may make an appointment through the [facility] or you can contact the office that suggested your procedure.  If you would like to make an appointment with Dr. Rivas, you may do so through the [facility] OR you can call [phone] ([employer]) OR [phone] ([facility]) during clinic hours (Monday - Friday, 8AM - 5PM).      What you need to do:    Keep a record of your response to the injection you had today.    How much relief did you get?   When did the relief start and how long did it last?  Were you able to decrease the use of any of your pain medications?  Were you able to increase your level of activity?  How long did the relief last?    What to watch out for:    If you experience any of the following symptoms after your procedure, please notify the messaging service immediately (see above for contact information):   fever (increased oral temperature)   bleeding or  swelling at the injection site,    drainage, rash or redness at the injection site    possible signs of infection    increased pain at the injection site   worsening of your usual pain   severe headache   new or worsening numbness    new arm and/or leg weakness, or    changes in bowel and/or bladder function: urinating or defecating on yourself and not knowing that you did it.    PLEASE FOLLOW ALL INSTRUCTIONS CAREFULLY     Do not engage in strenuous activity (e.g., lifting or pushing heavy objects or repeated bending) for 24 hours.     Do not take a bath, swim or use Jacuzzi for 24 hours after procedure. (A shower is fine).   Remove any Band-Aids when you get home.    Use cold/ice, as needed for comfort.  We recommend the use of cold therapy alternating on for 20 minutes, off for 20 minutes.    Do not apply direct heat (heating pad or heat packs) to the injection site for 24 hours.     Resume your usual medications, unless instructed otherwise by your Pain Physician.     If you are on warfarin (Coumadin) or other blood thinner, resume this medication as instructed by your prescribing Physician.    IF AT ANY POINT YOU ARE VERY CONCERNED ABOUT YOUR SYMPTOMS, PLEASE GO TO THE EMERGENCY ROOM.    If you develop worsening pain, weakness, numbness, lose bowel or bladder control (i.e., having an accident where you did not even know you had to go to the bathroom and suddenly noticed you soiled yourself), saddle anesthesia (a loss of sensation restricted to the area of the buttocks, anus and between the legs -- i.e., those parts of your body that would touch a saddle if you were sitting on one) you need to go immediately to the emergency department for evaluation and treatment.    ----------------------------------------------------------------------------------------------------------------------------------------------------------------  If you received Sedation please read the following instructions:  POST SEDATION  INSTRUCTIONS    Today you received intravenous medication (also known as sedation) that was used to help you relax and/or decrease discomfort during your procedure. This medication will be acting in your body for the next 24 hours, so you might feel a little tired or sleepy. This feeling will slowly wear off.   Common side effects associated with these medications include: drowsiness, dizziness, sleepiness, confusion, feeling excited, difficulty remembering things, lack of steadiness with walking or balance, loss of fine muscle control, slowed reflexes, difficulty focusing, and blurred vision.  Some over-the-counter and prescription medications (e.g., muscle relaxants, opioids, mood-altering medications, sedatives/hypnotics, antihistamines) can interact with the intravenous medication you received and cause an increased risk of the side effects listed above in addition to other potentially life threatening side effects. Use extreme caution if you are taking such medications, and consult with your Pain Physician or prescribing physician if you have any questions.  For the next 12-24 hours:    DO NOT--Drive a car, operate machinery or power tools   DO NOT--Drink any alcoholic beverages (not even beer), they may dangerously increase the risk of side effects.    DO NOT--Make any important legal or business decisions or sign important documents.  We advise you to have someone to assist you at home. Move slowly and carefully. Do not make sudden changes in position. Be aware of dizziness or light-headedness and move accordingly.   If you seek medical treatment within 24 hours, let the nurse or doctor caring for you know that you have received the above medications. If you have any questions or concerns related to your sedation or treatment today please contact us.

## 2024-11-05 ENCOUNTER — OFFICE VISIT (OUTPATIENT)
Dept: PAIN MEDICINE | Facility: CLINIC | Age: 54
End: 2024-11-05
Payer: COMMERCIAL

## 2024-11-05 VITALS
HEIGHT: 69 IN | SYSTOLIC BLOOD PRESSURE: 143 MMHG | DIASTOLIC BLOOD PRESSURE: 87 MMHG | WEIGHT: 205 LBS | HEART RATE: 69 BPM | BODY MASS INDEX: 30.36 KG/M2

## 2024-11-05 DIAGNOSIS — M46.1 SACROILIITIS: Primary | ICD-10-CM

## 2024-11-05 DIAGNOSIS — M47.816 LUMBAR SPONDYLOSIS: ICD-10-CM

## 2024-11-05 PROCEDURE — 4010F ACE/ARB THERAPY RXD/TAKEN: CPT | Mod: CPTII,S$GLB,, | Performed by: STUDENT IN AN ORGANIZED HEALTH CARE EDUCATION/TRAINING PROGRAM

## 2024-11-05 PROCEDURE — 3077F SYST BP >= 140 MM HG: CPT | Mod: CPTII,S$GLB,, | Performed by: STUDENT IN AN ORGANIZED HEALTH CARE EDUCATION/TRAINING PROGRAM

## 2024-11-05 PROCEDURE — 3079F DIAST BP 80-89 MM HG: CPT | Mod: CPTII,S$GLB,, | Performed by: STUDENT IN AN ORGANIZED HEALTH CARE EDUCATION/TRAINING PROGRAM

## 2024-11-05 PROCEDURE — 3008F BODY MASS INDEX DOCD: CPT | Mod: CPTII,S$GLB,, | Performed by: STUDENT IN AN ORGANIZED HEALTH CARE EDUCATION/TRAINING PROGRAM

## 2024-11-05 PROCEDURE — 3044F HG A1C LEVEL LT 7.0%: CPT | Mod: CPTII,S$GLB,, | Performed by: STUDENT IN AN ORGANIZED HEALTH CARE EDUCATION/TRAINING PROGRAM

## 2024-11-05 PROCEDURE — 1160F RVW MEDS BY RX/DR IN RCRD: CPT | Mod: CPTII,S$GLB,, | Performed by: STUDENT IN AN ORGANIZED HEALTH CARE EDUCATION/TRAINING PROGRAM

## 2024-11-05 PROCEDURE — 1159F MED LIST DOCD IN RCRD: CPT | Mod: CPTII,S$GLB,, | Performed by: STUDENT IN AN ORGANIZED HEALTH CARE EDUCATION/TRAINING PROGRAM

## 2024-11-05 PROCEDURE — 99999 PR PBB SHADOW E&M-EST. PATIENT-LVL III: CPT | Mod: PBBFAC,,, | Performed by: STUDENT IN AN ORGANIZED HEALTH CARE EDUCATION/TRAINING PROGRAM

## 2024-11-05 PROCEDURE — 99213 OFFICE O/P EST LOW 20 MIN: CPT | Mod: S$GLB,,, | Performed by: STUDENT IN AN ORGANIZED HEALTH CARE EDUCATION/TRAINING PROGRAM

## 2024-11-05 NOTE — PROGRESS NOTES
Chronic patient Established Note (Follow up visit)      SUBJECTIVE:    INTERVAL HISTORY 11/5/2024:  Mr. Luna returns for lower back pain. Current Pain level is 4/10.  He reports that he had greater than 50% improvement after his bilateral SI joint injections.  He reports he can now get out of bed without pain and he is able to walk further without needing to sit down. He is taking no medication for the pain at the moment.    INITIAL HPI:  Joshua Luna presents to the clinic for the evaluation of lower back and left leg pain. The pain started years (when 14 years old) ago following no inciting event and symptoms have been worsening.The pain is located in the lower back area and radiates to the left leg (back of thigh).  The pain is described as sharp, shooting, and tingling and is rated as 6/10. The pain is rated with a score of  2/10 on the BEST day and a score of 8/10 on the WORST day.  Symptoms interfere with daily activity, sleeping, and work. The pain is exacerbated by Sitting and Getting out of bed/chair.  The pain is mitigated by standing/stretching. The patient reports 4-6 hours of uninterrupted sleep per night.    Patient denies urinary incontinence and bowel incontinence. He reports that the pain was severe in his 20s, but only recently started affecting him. He went to a chiropractor in the early 90s but it didn't help. He also had left knee surgery which also contributes to his pain and some left knee weakness (sometimes the knee sergio)    Physical Therapy/Home Exercise: yes, years ago, continues to do [facility] spine conditioning program as given from [facility] in November 2023      Pain Disability Index Review:      11/5/2024     3:52 PM 5/21/2024     8:38 AM   Last 3 PDI Scores   Pain Disability Index (PDI) 42 42       Pain Medications:   - ibuprofen/tylenol (helps a little)     report:  Reviewed and consistent with medication use as prescribed.    Pain Procedures:   None    Imaging:   MRI LUMBAR SPINE  WITHOUT CONTRAST     CLINICAL HISTORY:  Low back pain, symptoms persist with > 6wks conservative treatment; Dorsalgia, unspecified     TECHNIQUE:  Multiplanar, multisequence MR images were acquired from the thoracolumbar junction to the sacrum without the administration of contrast.     FINDINGS:  Alignment: Grade 1 anterolisthesis at L5-S1 demonstrates reduction in the sub pontine MRI environment.     Vertebrae: 5 lumbar-type vertebral bodies. No aggressive marrow replacement process or fracture.Nobone marrow edema .     Discs: Satisfactory height and signal.     Cord: Normal. Conus terminates at L1-L2.     Degenerative findings:     T12-L1: There is no focal disc herniation. No significant central canal narrowing . No significant neural foraminal narrowing.     L1-L2: Broad-based subligamentous disc extrusion with effacement of the anterior thecal sac margins.  Facet hypertrophy with facet fluid lateralizing RIGHT..  Mild central canal narrowing . No significant neural foraminal narrowing.     L2-L3: There is no focal disc herniation.Mild facet hypertrophy.  No significant central canal narrowing . No significant neural foraminal narrowing.     L3-L4: There is no focal disc herniation. No significant central canal narrowing . No significant neural foraminal narrowing.     L4-L5: There is no focal disc herniation. No significant central canal narrowing . No significant neural foraminal narrowing.     L5-S1: Grade 1 anterolisthesis with spondylolysis.   There is no focal disc herniation. No significant central canal narrowing . Mild LEFT greater than RIGHT neural foraminal narrowing.     Paraspinal muscles & soft tissues: Unremarkable.     Impression:     Broad-based disc extrusion subligamentous L1-L2 with effacement of the anterior thecal sac margins, and mild facet hypertrophy results in mild central canal narrowing.     Grade 1 anterolisthesis best seen on radiograph of 01/06/2024 with associated spondylolysis         Electronically signed by:Don Barr MD  Date:                                            05/14/2024  Time:                                           08:59    XR LUMBAR SPINE AP AND LAT WITH FLEX/EXT     CLINICAL HISTORY:  Other intervertebral disc degeneration, lumbar region     TECHNIQUE:  AP and lateral views as well as lateral flexion and extension images are performed through the lumbar spine.     COMPARISON:  None     FINDINGS:  There is a grade 1 L5/S1 anterolisthesis with probably an associated spondylolysis..  The lumbosacral disc space is narrowed.  The other disc spaces are well maintained.  Mild DJD.  No acute fracture or bone destruction identified     Impression:     See above        Electronically signed by:Rob Blake MD  Date:                                            11/06/2023  Time:                                           13:10    Past Medical History:   Diagnosis Date    Arthritis     Dry eyes     Dry mouth     Hypertension     Personal history of colonic polyps 12/17/2021     Past Surgical History:   Procedure Laterality Date    BONE GRAFT  06/2021    COLONOSCOPY N/A 12/17/2021    Procedure: COLONOSCOPY;  Surgeon: Yumiko Donald MD;  Location: [facility];  Service: Endoscopy;  Laterality: N/A;    ELBOW SURGERY  2006    right surgery    INJECTION, SACROILIAC JOINT Bilateral 10/18/2024    Procedure: Bilateral SI Joint Injection;  Surgeon: Martín Rivas MD;  Location: [facility];  Service: Pain Management;  Laterality: Bilateral;  oral sed  no AC    KNEE SURGERY  2009    5    ROTATOR CUFF REPAIR  [id number]    right shoulder    VASECTOMY  2003     Social History     Socioeconomic History    Marital status:    Tobacco Use    Smoking status: Never     Passive exposure: Never    Smokeless tobacco: Never   Substance and Sexual Activity    Alcohol use: Yes     Comment: occasional    Drug use: No     Family History   Problem Relation Name Age of Onset    Cancer Mother    "       Breast & colon    Arthritis Mother      Bell's palsy Mother      Rheum arthritis Mother      Diabetes Father      Arthritis Father      Pulmonary fibrosis Father      Hypertension Father      Arthritis Sister      Asthma Daughter      Eczema Daughter      Asthma Son      Narcolepsy Sister      No Known Problems Sister      No Known Problems Sister      No Known Problems Sister         Review of patient's allergies indicates:  No Known Allergies    Current Outpatient Medications   Medication Sig    diclofenac sodium (VOLTAREN) 1 % Gel Apply 2 g topically 3 (three) times daily.    ipratropium (ATROVENT) 21 mcg (0.03 %) nasal spray Use 2 sprays by Each Nostril route 3 (three) times daily.    omeprazole (PRILOSEC) 40 MG capsule Take 1 capsule (40 mg total) by mouth once daily. As needed.    valsartan (DIOVAN) 80 MG tablet Take 1 tablet (80 mg total) by mouth once daily.     No current facility-administered medications for this visit.       REVIEW OF SYSTEMS:    GENERAL:  current fevers or chills, recent use of antibiotics denies.  HEENT:  History of migraines/headaches: denies, History of major throat surgery: denies  RESPIRATORY:  History of home oxygen or pulmonary hypertension/severe breathing dysfunction: denies  CARDIOVASCULAR:  Hx of palpitations/rhythm problems: denies Hx of Heart Attacks/Surgery: denies  GI:  Recent abdominal discomfort or recent change in bowel habits denies  MUSCULOSKELETAL:  See HPI.  SKIN:  unhealed wounds or rashes: denies  PSYCH: major psychiatric history or recent psychosocial stressors denies  HEMATOLOGY/LYMPHOLOGY:  Hx of prolonged bleeding, Hx of Blood thinner usage: denies  NEURO:   history of seizures, strokes, chronic/old weakness (such as paralysis or paresis of any body part): denies  All other reviewed and negative other than HPI.    OBJECTIVE:    BP (!) 143/87 (BP Location: Left arm, Patient Position: Sitting)   Pulse 69   Ht 5' 9" (1.753 m)   Wt 93 kg (205 lb 0.4 oz)  "  BMI 30.28 kg/m²     PHYSICAL EXAMINATION:  General appearance: Well appearing, in no acute distress, alert and appropriately communicative.  Psych:  Mood and affect appropriate.  Skin: Skin color, texture, turgor normal, no rashes or lesions, in both upper and lower body for exposed skin.  Head/face:  Atraumatic, normocephalic.  Cor: regular rate  Pulm: non-labored breathing  GI: Abdomen non-distended and non-tender.  Back: Straight leg raising in the sitting and supine positions is negative to radicular pain.  pain to palpation over the spine and/or paraspinal muscles. Pain with lumbar extension and facet loading.  Extremities: No deformities, significant lymphedema, or skin discoloration. No significant open wounds. No major amputations.  Musculoskeletal:   hip, sacroiliac and knee provocative maneuvers are negative. Bilateral upper and lower extremity strength is normal and symmetric.  No atrophy or tone abnormalities are noted.  Neuro: Bilateral upper and lower extremity coordination and muscle stretch reflexes abnormalities noted: none.  Kirk and/or Clonus: negative; loss of sensation to light touch: none  Gait: none    CMP  Sodium   Date Value Ref Range Status   05/15/2023 137 136 - 145 mmol/L Final     Potassium   Date Value Ref Range Status   05/15/2023 4.2 3.5 - 5.1 mmol/L Final     Chloride   Date Value Ref Range Status   05/15/2023 104 95 - 110 mmol/L Final     CO2   Date Value Ref Range Status   05/15/2023 25 23 - 29 mmol/L Final     Glucose   Date Value Ref Range Status   05/15/2023 112 (H) 70 - 110 mg/dL Final     BUN   Date Value Ref Range Status   05/15/2023 11 2 - 20 mg/dL Final     Creatinine   Date Value Ref Range Status   05/15/2023 0.85 0.50 - 1.40 mg/dL Final     Calcium   Date Value Ref Range Status   05/15/2023 9.1 8.7 - 10.5 mg/dL Final     Total Protein   Date Value Ref Range Status   01/27/2023 7.4 6.0 - 8.4 g/dL Final     Albumin   Date Value Ref Range Status   01/27/2023 4.8 3.5 -  5.2 g/dL Final     Total Bilirubin   Date Value Ref Range Status   01/27/2023 0.5 0.1 - 1.0 mg/dL Final     Comment:     For infants and newborns, interpretation of results should be based  on gestational age, weight and in agreement with clinical  observations.    Premature Infant recommended reference ranges:  Up to 24 hours.............<8.0 mg/dL  Up to 48 hours............<12.0 mg/dL  3-5 days..................<15.0 mg/dL  6-29 days.................<15.0 mg/dL       Alkaline Phosphatase   Date Value Ref Range Status   01/27/2023 66 38 - 126 U/L Final     AST   Date Value Ref Range Status   01/27/2023 42 15 - 46 U/L Final     ALT   Date Value Ref Range Status   01/27/2023 58 (H) 10 - 44 U/L Final     Anion Gap   Date Value Ref Range Status   05/15/2023 8 8 - 16 mmol/L Final     eGFR   Date Value Ref Range Status   05/15/2023 >60.0 >60 mL/min/1.73 m^2 Final     ASSESSMENT: 54 y.o. year old male with lower back pain, consistent with:    1. Sacroiliitis        2. Lumbar spondylosis            IMPRESSION: Joshua Luna presents today for lower back pain and left hip pain.  History and physical exam are consistent with left sacroiliitis and lumbar facetogenic pain.  My independent interpretation of the imaging is consistent with lumbar degenerative disc disease with central canal narrowing at L1/2 and L5/S1.  He had excellent relief from his bilateral SI joint injections. They are doing well today.  They feel they have improved in terms of their pain control and their functional abilities. They are satisfied with their management of pain thus far.  We can continue with conservative management with a robust home exercise program and medications, as appropriate.    PLAN:   - I have stressed the importance of physical activity and a home exercise plan to help with pain and improve health.  - Patient can continue with medications for now since they are providing benefits, using them appropriately, and without side  effects.  - he can continue ibuprofen and tylenol as needed for his pain, as he finds these somewhat effective.  - counseled on continuing home exercise program  - RTC as needed  - Counseled patient regarding the importance of activity modification and physical therapy.    The above plan and management options were discussed at length with patient. Patient is in agreement with the above and verbalized understanding. It will be communicated with the referring physician via electronic record, fax, or mail.    Martín Rivas  11/05/2024

## 2025-01-03 ENCOUNTER — OFFICE VISIT (OUTPATIENT)
Dept: URGENT CARE | Facility: CLINIC | Age: 55
End: 2025-01-03
Payer: COMMERCIAL

## 2025-01-03 VITALS
OXYGEN SATURATION: 96 % | SYSTOLIC BLOOD PRESSURE: 130 MMHG | BODY MASS INDEX: 30.5 KG/M2 | RESPIRATION RATE: 16 BRPM | HEIGHT: 69 IN | DIASTOLIC BLOOD PRESSURE: 84 MMHG | HEART RATE: 75 BPM | TEMPERATURE: 98 F | WEIGHT: 205.94 LBS

## 2025-01-03 DIAGNOSIS — H65.02 ACUTE SEROUS OTITIS MEDIA OF LEFT EAR, RECURRENCE NOT SPECIFIED: ICD-10-CM

## 2025-01-03 DIAGNOSIS — J06.9 URI WITH COUGH AND CONGESTION: Primary | ICD-10-CM

## 2025-01-03 DIAGNOSIS — R05.9 COUGH, UNSPECIFIED TYPE: ICD-10-CM

## 2025-01-03 LAB
CTP QC/QA: YES
POC MOLECULAR INFLUENZA A AGN: NEGATIVE
POC MOLECULAR INFLUENZA B AGN: NEGATIVE

## 2025-01-03 RX ORDER — PREDNISONE 20 MG/1
40 TABLET ORAL DAILY
Qty: 8 TABLET | Refills: 0 | Status: SHIPPED | OUTPATIENT
Start: 2025-01-03 | End: 2025-01-07

## 2025-01-03 RX ORDER — BROMPHENIRAMINE MALEATE, PSEUDOEPHEDRINE HYDROCHLORIDE, AND DEXTROMETHORPHAN HYDROBROMIDE 2; 30; 10 MG/5ML; MG/5ML; MG/5ML
10 SYRUP ORAL EVERY 6 HOURS PRN
Qty: 118 ML | Refills: 0 | Status: SHIPPED | OUTPATIENT
Start: 2025-01-03 | End: 2025-01-13

## 2025-01-03 NOTE — PATIENT INSTRUCTIONS
You have been prescribed a steroid today. Take the prescription as directed. Steroids can increase blood sugar. You can also have the following when taking steroids: flushing, jitteriness, weight gain, fluid retention, bone weakening. If you develop any adverse symptoms, stop taking the medication immediately.    Thank you for choosing [facility]!     Our goal in the Urgent Care is to always provide outstanding medical care. You may receive a survey by mail or e-mail in the next week regarding your experience today. We would greatly appreciate you completing and returning the survey. Your feedback provides us with a way to recognize our staff who provide very good care, and it helps us learn how to improve when your experience was below our aspiration of excellence.       We appreciate you trusting us with your medical care. We hope you feel better soon. We will be happy to take care of you for all of your future medical needs.    You must understand that you've received an Urgent Care treatment only and that you may be released before all your medical problems are known or treated. You, the patient, will arrange for follow up care as instructed.      Follow up with your PCP or specialty clinic as instructed in the next 2-3 days if not improved or as needed. You can call [phone] to schedule an appointment with appropriate provider.      If you condition worsens, we recommend that you receive another evaluation at the emergency room immediately or contact your primary medical clinic's after hours call service to discuss your concerns.      Please return here or go to the Emergency Department for any concerns or worsening condition.

## 2025-01-03 NOTE — PROGRESS NOTES
"Subjective:      Patient ID: Joshua Luna is a 54 y.o. male.    Vitals:  height is 5' 9" (1.753 m) and weight is 93.4 kg (205 lb 14.6 oz). His oral temperature is 97.9 °F (36.6 °C). His blood pressure is 130/84 and his pulse is 75. His respiration is 16 and oxygen saturation is 96%.     Chief Complaint: Sinus Problem    Pt complains of cough, nasal congestion, left ear pain, and body aches that started 4-5 days ago. Pt said his whole family had similar symptoms. Pt said cough is worse at night.     Patient provider note starts here:    Patient presents to the clinic with complaints of a 3-4 day history of cough, nasal congestion, left-sided otalgia and generalized malaise.  Reports that several others with in his household have had similar symptoms as well.  Reports that the cough is worsened at nighttime in his sometimes productive in the mornings.  Other than that,  his cough is mainly dry.  He denies any documented fevers or shortness of breath.    Sinus Problem  This is a new problem. Episode onset: 3-4 days ago. The problem has been gradually worsening since onset. There has been no fever. His pain is at a severity of 6/10 (ear pain). The pain is moderate. Associated symptoms include congestion, coughing, ear pain (left ear), headaches and a sore throat. Pertinent negatives include no chills, neck pain, shortness of breath or sinus pressure. Treatments tried: OTC coricidin, RX cough syrup.       Constitution: Negative for chills and fever.   HENT:  Positive for ear pain (left ear), congestion, postnasal drip and sore throat. Negative for sinus pain and sinus pressure.    Neck: Negative for neck pain and neck stiffness.   Cardiovascular:  Negative for chest pain.   Respiratory:  Positive for cough and sputum production (only in the morning). Negative for chest tightness, shortness of breath and wheezing.    Gastrointestinal:  Negative for abdominal pain, vomiting and diarrhea.   Musculoskeletal:  Positive for " muscle ache. Negative for pain.   Skin:  Negative for rash and wound.   Allergic/Immunologic: Negative for itching.   Neurological:  Positive for headaches. Negative for numbness and tingling.      Objective:     Physical Exam   Constitutional: He is oriented to person, place, and time. He appears well-developed. He is cooperative.  Non-toxic appearance. He does not appear ill. No distress.   HENT:   Head: Normocephalic and atraumatic.   Ears:   Right Ear: Hearing, tympanic membrane, external ear and ear canal normal.   Left Ear: Hearing, external ear and ear canal normal.      Comments: The left TM is injected with a serous middle ear effusion    Nose: Nose normal. No mucosal edema, rhinorrhea or nasal deformity. No epistaxis. Right sinus exhibits no maxillary sinus tenderness and no frontal sinus tenderness. Left sinus exhibits no maxillary sinus tenderness and no frontal sinus tenderness.   Mouth/Throat: Uvula is midline and mucous membranes are normal. No trismus in the jaw. Normal dentition. No uvula swelling. Posterior oropharyngeal erythema present. No oropharyngeal exudate or posterior oropharyngeal edema.   Eyes: Conjunctivae and lids are normal. No scleral icterus.   Neck: Trachea normal and phonation normal. Neck supple. No edema present. No erythema present. No neck rigidity present.   Cardiovascular: Normal rate, regular rhythm, normal heart sounds and normal pulses.   Pulmonary/Chest: Effort normal and breath sounds normal. No respiratory distress. He has no decreased breath sounds. He has no wheezes. He has no rhonchi.   Abdominal: Normal appearance.   Musculoskeletal: Normal range of motion.         General: No deformity. Normal range of motion.   Neurological: He is alert and oriented to person, place, and time. He exhibits normal muscle tone. Coordination normal.   Skin: Skin is warm, dry, intact, not diaphoretic and not pale.   Psychiatric: His speech is normal and behavior is normal. Judgment and  thought content normal.   Nursing note and vitals reviewed.      Assessment:     1. URI with cough and congestion    2. Cough, unspecified type    3. Acute serous otitis media of left ear, recurrence not specified      Results for orders placed or performed in visit on 01/03/25   POCT Influenza A/B MOLECULAR    Collection Time: 01/03/25 12:40 PM   Result Value Ref Range    POC Molecular Influenza A Ag Negative Negative    POC Molecular Influenza B Ag Negative Negative     Acceptable Yes        Plan:       URI with cough and congestion  -     predniSONE (DELTASONE) 20 MG tablet; Take 2 tablets (40 mg total) by mouth once daily. for 4 days  Dispense: 8 tablet; Refill: 0  -     brompheniramine-pseudoeph-DM (BROMFED DM) 2-30-10 mg/5 mL Syrp; Take 10 mLs by mouth every 6 (six) hours as needed (Cough and congestion).  Dispense: 118 mL; Refill: 0    Cough, unspecified type  -     POCT Influenza A/B MOLECULAR    Acute serous otitis media of left ear, recurrence not specified  -     predniSONE (DELTASONE) 20 MG tablet; Take 2 tablets (40 mg total) by mouth once daily. for 4 days  Dispense: 8 tablet; Refill: 0          Medical Decision Making:   History:   Old Medical Records: I decided to obtain old medical records.  Old Records Summarized: records from clinic visits.  Urgent Care Management:  A. Problem List:   -Acute: Serous otitis media, URI with cough and congestion    -Chronic: osteoarthritis   B. Differential diagnosis: viral vs bacterial URI, pharyngitis, otitis, COVID 19, influenza, pneumonia  C. Diagnostic Testing Ordered: Flu   D. Diagnostic Testing Considered: None  E. Independent Historians: None  F. Urgent Care Midlevel Independent Results Interpretation: Flu negative   G. Radiology:  H. Review of Previous Medical Records:   I. Home Medications Reviewed  J. Social Determinants of Health considered  K. Medical Decision Making and Disposition:   Patient presents to the clinic with complaints of URI  like symptoms for the past few days.  On exam, he is afebrile and nontoxic in appearance.  One of his complaints of left-sided otalgia and he does have an injected tympanic membrane with clear fluid behind it.  His flu test is negative.  Likely other viral etiology especially given the fact that several members of his household have been ill with similar symptoms as well.  Advised symptomatic treatment and close follow-up with primary care.  ED precautions were discussed.  He  verbalized understanding and agreed with this plan.         Patient Instructions   You have been prescribed a steroid today. Take the prescription as directed. Steroids can increase blood sugar. You can also have the following when taking steroids: flushing, jitteriness, weight gain, fluid retention, bone weakening. If you develop any adverse symptoms, stop taking the medication immediately.    Thank you for choosing [facility]!     Our goal in the Urgent Care is to always provide outstanding medical care. You may receive a survey by mail or e-mail in the next week regarding your experience today. We would greatly appreciate you completing and returning the survey. Your feedback provides us with a way to recognize our staff who provide very good care, and it helps us learn how to improve when your experience was below our aspiration of excellence.       We appreciate you trusting us with your medical care. We hope you feel better soon. We will be happy to take care of you for all of your future medical needs.    You must understand that you've received an Urgent Care treatment only and that you may be released before all your medical problems are known or treated. You, the patient, will arrange for follow up care as instructed.      Follow up with your PCP or specialty clinic as instructed in the next 2-3 days if not improved or as needed. You can call [phone] to schedule an appointment with appropriate provider.      If you  condition worsens, we recommend that you receive another evaluation at the emergency room immediately or contact your primary medical clinic's after hours call service to discuss your concerns.      Please return here or go to the Emergency Department for any concerns or worsening condition.

## 2025-01-28 ENCOUNTER — CLINICAL SUPPORT (OUTPATIENT)
Dept: OTHER | Facility: CLINIC | Age: 55
End: 2025-01-28
Payer: COMMERCIAL

## 2025-01-28 DIAGNOSIS — Z00.8 ENCOUNTER FOR OTHER GENERAL EXAMINATION: ICD-10-CM

## 2025-01-28 PROCEDURE — 99401 PREV MED CNSL INDIV APPRX 15: CPT | Mod: S$GLB,,, | Performed by: INTERNAL MEDICINE

## 2025-01-28 PROCEDURE — 80061 LIPID PANEL: CPT | Mod: QW,S$GLB,, | Performed by: INTERNAL MEDICINE

## 2025-01-28 PROCEDURE — 82947 ASSAY GLUCOSE BLOOD QUANT: CPT | Mod: QW,S$GLB,, | Performed by: INTERNAL MEDICINE

## 2025-01-29 VITALS
BODY MASS INDEX: 30.62 KG/M2 | DIASTOLIC BLOOD PRESSURE: 80 MMHG | HEIGHT: 68 IN | WEIGHT: 202 LBS | SYSTOLIC BLOOD PRESSURE: 122 MMHG

## 2025-01-29 LAB
GLUCOSE SERPL-MCNC: 100 MG/DL (ref 60–140)
HDLC SERPL-MCNC: 61 MG/DL
POC CHOLESTEROL, LDL (DOCK): 77 MG/DL
POC CHOLESTEROL, TOTAL: 166 MG/DL
TRIGL SERPL-MCNC: 168 MG/DL

## 2025-02-04 ENCOUNTER — OFFICE VISIT (OUTPATIENT)
Dept: FAMILY MEDICINE | Facility: CLINIC | Age: 55
End: 2025-02-04
Payer: COMMERCIAL

## 2025-02-04 VITALS
OXYGEN SATURATION: 95 % | TEMPERATURE: 98 F | WEIGHT: 205.81 LBS | DIASTOLIC BLOOD PRESSURE: 80 MMHG | SYSTOLIC BLOOD PRESSURE: 134 MMHG | HEART RATE: 81 BPM | BODY MASS INDEX: 31.19 KG/M2 | HEIGHT: 68 IN

## 2025-02-04 DIAGNOSIS — R73.9 HYPERGLYCEMIA: ICD-10-CM

## 2025-02-04 DIAGNOSIS — Z00.00 ROUTINE HEALTH MAINTENANCE: Primary | ICD-10-CM

## 2025-02-04 DIAGNOSIS — Z23 FLU VACCINE NEED: ICD-10-CM

## 2025-02-04 RX ORDER — ONDANSETRON 4 MG/1
4 TABLET, ORALLY DISINTEGRATING ORAL EVERY 4 HOURS PRN
COMMUNITY
Start: 2024-09-26

## 2025-02-09 DIAGNOSIS — R74.01 ELEVATED TRANSAMINASE LEVEL: Primary | ICD-10-CM

## 2025-02-17 NOTE — PROGRESS NOTES
" Patient ID: Joshua Luna is a 54 y.o. male.    Chief Complaint: Annual Exam    HPI      Joshua Luna is a 54 y.o. male. here for annual exam.   No current complaints.    History of Present Illness              Review of Symptoms    Constitutional: Negative.    HENT: Negative.    Eyes: Negative.    Respiratory: Negative.    Cardiovascular: Negative.    Gastrointestinal: Negative.    Endocrine: Negative.    Genitourinary: Negative.    Musculoskeletal: Negative.    Skin: Negative.    Allergic/Immunologic: Negative.    Neurological: Negative.    Hematological: Negative.    Psychiatric/Behavioral: Negative.      Except as above in HPI      Vitals:    02/04/25 1129   BP: 134/80   BP Location: Left arm   Patient Position: Sitting   Pulse: 81   Temp: 97.9 °F (36.6 °C)   TempSrc: Oral   SpO2: 95%   Weight: 93.3 kg (205 lb 12.8 oz)   Height: 5' 8" (1.727 m)        Physical  Exam      Constitutional:  Oriented to person, place, and time. Appears well-developed and well-nourished.     HENT:   Head: Normocephalic and atraumatic.     Right Ear: Tympanic membrane, ear canal and External ear normal     Left Ear: Tympanic membrane, ear canal and External ear normal     Nose: Nose normal. No rhinorrhea or nasal deformity.     Mouth/Throat: Uvula is midline, oropharynx is clear and moist and mucous membranes are normal.      Eyes: Conjunctivae are normal. Right eye exhibits no discharge. Left eye exhibits no discharge. No scleral icterus.     Neck:  No JVD present. No tracheal deviation  []  Neck supple.   []  No Carotid bruit    Cardiovascular:  Regular rate and rhythm with normal S1 and S2     Pulmonary/Chest:   Clear to auscultation bilaterally without wheezes, rhonchi or rales    Musculoskeletal: Normal range of motion. No edema or tenderness.   No deformity     Lymphadenopathy:  No cervical adenopathy.     Neurological:  Alert and oriented to person, place, and time. Coordination normal.     Skin: Skin is warm and dry. No rash " noted.     Psychiatric: Normal mood and affect. Speech is normal and behavior is normal. Judgment and thought content normal.     Physical Exam              Complete Blood Count  Lab Results   Component Value Date    RBC 4.59 (L) 02/06/2025    HGB 14.6 02/06/2025    HCT 44.4 02/06/2025    MCV 97 02/06/2025    MCH 31.8 (H) 02/06/2025    MCHC 32.9 02/06/2025    RDW 12.1 02/06/2025     02/06/2025    MPV 10.0 02/06/2025    GRAN 2.0 02/06/2025    GRAN 47.9 02/06/2025    LYMPH 1.1 02/06/2025    LYMPH 25.7 02/06/2025    MONO 0.9 02/06/2025    MONO 20.2 (H) 02/06/2025    EOS 0.2 02/06/2025    BASO 0.05 02/06/2025    EOSINOPHIL 5.0 02/06/2025    BASOPHIL 1.2 02/06/2025    DIFFMETHOD Automated 02/06/2025       Comprehensive Metabolic Panel  Lab Results   Component Value Date    GLU 86 02/06/2025    BUN 9 02/06/2025    CREATININE 0.8 02/06/2025     02/06/2025    K 3.8 02/06/2025     02/06/2025    PROT 7.4 02/06/2025    ALBUMIN 4.2 02/06/2025    BILITOT 0.2 02/06/2025    AST 59 (H) 02/06/2025    ALKPHOS 56 02/06/2025    CO2 23 02/06/2025    ALT 75 (H) 02/06/2025    ANIONGAP 10 02/06/2025       TSH  Lab Results   Component Value Date    TSH 1.271 02/06/2025       Assessment / Plan:      ICD-10-CM ICD-9-CM   1. Routine health maintenance  Z00.00 V70.0   2. Flu vaccine need  Z23 V04.81   3. Hyperglycemia  R73.9 790.29     Routine health maintenance  -     Comprehensive Metabolic Panel; Future  -     CBC Auto Differential; Future  -     Lipid Panel; Future  -     TSH; Future  -     Hemoglobin A1C; Future; Expected date: 02/04/2025    Flu vaccine need  -     influenza (Flulaval, Fluzone, Fluarix) 45 mcg/0.5 mL IM vaccine (> or = 6 mo) 0.5 mL    Hyperglycemia  -     Comprehensive Metabolic Panel; Future  -     CBC Auto Differential; Future  -     Lipid Panel; Future  -     TSH; Future  -     Hemoglobin A1C; Future; Expected date: 02/04/2025              Discussed how to stay healthy including: diet, and  exercise.

## 2025-02-20 RX ORDER — VALSARTAN 80 MG/1
80 TABLET ORAL
Qty: 90 TABLET | Refills: 3 | Status: SHIPPED | OUTPATIENT
Start: 2025-02-20

## 2025-02-20 NOTE — TELEPHONE ENCOUNTER
Refill Decision Note   Joshuakamila Luna  is requesting a refill authorization.  Brief Assessment and Rationale for Refill:  Approve     Medication Therapy Plan:        Comments:     Note composed:1:46 PM 02/20/2025

## 2025-02-20 NOTE — TELEPHONE ENCOUNTER
No care due was identified.  Health [facility] Embedded Care Due Messages. Reference number: [id number].   2/20/2025 11:25:22 AM CST

## 2025-04-09 ENCOUNTER — PATIENT MESSAGE (OUTPATIENT)
Dept: ADMINISTRATIVE | Facility: HOSPITAL | Age: 55
End: 2025-04-09
Payer: COMMERCIAL

## 2025-04-10 ENCOUNTER — PATIENT OUTREACH (OUTPATIENT)
Dept: ADMINISTRATIVE | Facility: HOSPITAL | Age: 55
End: 2025-04-10
Payer: COMMERCIAL

## 2025-04-10 DIAGNOSIS — Z12.11 SPECIAL SCREENING FOR MALIGNANT NEOPLASMS, COLON: Primary | ICD-10-CM

## 2025-04-10 NOTE — PROGRESS NOTES
Population Health Chart Review & Patient Outreach Details      Additional [facility] Health Notes:               Updates Requested / Reviewed:      Updated Care Coordination Note, Care Everywhere, and Immunizations Reconciliation Completed or Queried: [facility] Topics Overdue:      [facility] Score: 1     Colon Cancer Screening                       Health Maintenance Topic(s) Outreach Outcomes & Actions Taken:    Colorectal Cancer Screening - Outreach Outcomes & Actions Taken  : Colonoscopy Case Request / Referral / Home Test Order Placed

## 2025-05-12 RX ORDER — IPRATROPIUM BROMIDE 21 UG/1
2 SPRAY, METERED NASAL 3 TIMES DAILY
Qty: 90 ML | Refills: 3 | Status: SHIPPED | OUTPATIENT
Start: 2025-05-12

## 2025-05-12 NOTE — TELEPHONE ENCOUNTER
No care due was identified.  [facility] Embedded Care Due Messages. Reference number: [id number].   5/12/2025 8:42:28 AM CDT

## 2025-06-07 ENCOUNTER — RESULTS FOLLOW-UP (OUTPATIENT)
Dept: FAMILY MEDICINE | Facility: CLINIC | Age: 55
End: 2025-06-07

## 2025-07-28 ENCOUNTER — PATIENT MESSAGE (OUTPATIENT)
Dept: GASTROENTEROLOGY | Facility: CLINIC | Age: 55
End: 2025-07-28
Payer: COMMERCIAL